# Patient Record
Sex: FEMALE | Race: WHITE | NOT HISPANIC OR LATINO | Employment: UNEMPLOYED | URBAN - NONMETROPOLITAN AREA
[De-identification: names, ages, dates, MRNs, and addresses within clinical notes are randomized per-mention and may not be internally consistent; named-entity substitution may affect disease eponyms.]

---

## 2017-02-28 ENCOUNTER — OFFICE VISIT (OUTPATIENT)
Dept: BARIATRICS/WEIGHT MGMT | Facility: CLINIC | Age: 42
End: 2017-02-28

## 2017-02-28 ENCOUNTER — NUTRITION (OUTPATIENT)
Dept: BARIATRICS/WEIGHT MGMT | Facility: HOSPITAL | Age: 42
End: 2017-02-28

## 2017-02-28 VITALS
DIASTOLIC BLOOD PRESSURE: 86 MMHG | BODY MASS INDEX: 35.51 KG/M2 | HEART RATE: 98 BPM | OXYGEN SATURATION: 100 % | RESPIRATION RATE: 20 BRPM | HEIGHT: 63 IN | SYSTOLIC BLOOD PRESSURE: 159 MMHG | WEIGHT: 200.4 LBS | TEMPERATURE: 98.2 F

## 2017-02-28 DIAGNOSIS — E11.69 DIABETES MELLITUS TYPE 2 IN OBESE (HCC): ICD-10-CM

## 2017-02-28 DIAGNOSIS — Z13.21 SCREENING FOR MALNUTRITION: ICD-10-CM

## 2017-02-28 DIAGNOSIS — E78.5 HYPERLIPIDEMIA, UNSPECIFIED HYPERLIPIDEMIA TYPE: ICD-10-CM

## 2017-02-28 DIAGNOSIS — E66.01 MORBID OBESITY, UNSPECIFIED OBESITY TYPE (HCC): Primary | ICD-10-CM

## 2017-02-28 DIAGNOSIS — I10 HTN (HYPERTENSION), BENIGN: ICD-10-CM

## 2017-02-28 DIAGNOSIS — E66.9 DIABETES MELLITUS TYPE 2 IN OBESE (HCC): ICD-10-CM

## 2017-02-28 DIAGNOSIS — E66.9 OBESITY, CLASS II, BMI 35-39.9: ICD-10-CM

## 2017-02-28 PROBLEM — E66.812 OBESITY, CLASS II, BMI 35-39.9: Status: ACTIVE | Noted: 2017-02-28

## 2017-02-28 PROCEDURE — 99204 OFFICE O/P NEW MOD 45 MIN: CPT | Performed by: NURSE PRACTITIONER

## 2017-02-28 PROCEDURE — 97803 MED NUTRITION INDIV SUBSEQ: CPT

## 2017-02-28 RX ORDER — VALSARTAN AND HYDROCHLOROTHIAZIDE 80; 12.5 MG/1; MG/1
1 TABLET, FILM COATED ORAL DAILY
COMMUNITY
Start: 2014-08-04 | End: 2021-10-10

## 2017-02-28 RX ORDER — ESOMEPRAZOLE MAGNESIUM 40 MG/1
40 CAPSULE, DELAYED RELEASE ORAL
COMMUNITY

## 2017-02-28 RX ORDER — SAXAGLIPTIN 5 MG/1
TABLET, FILM COATED ORAL DAILY
COMMUNITY
Start: 2017-02-08 | End: 2017-03-16 | Stop reason: DRUGHIGH

## 2017-02-28 NOTE — PROGRESS NOTES
Subjective   Ellie Gross is a 41 y.o. female.     History of Present Illness Ellie Gross is a 41 y.o. year-old who has morbid obesity and is interested in having surgical weight loss versus medical weight loss.  Patient has been overweight for the past 17 years. The most weight ever lost was 15 pounds from using low carb diet, fasting, and riding bicycle. Unsupervised diet attempts include: Gely's diet, Slim Fast, low carb diet, and Luis F Meng.  Supervised diet attempts include: Weight Watchers/  Patient has tried the following OTC or prescription medications for weight loss: Metformin (caused nausea and could not take; was actually given for diabetic tx); currently on buproprion for depression and has been for years.  Patient states she eats because the food makes her feel happy or she is bored.  Patient is limited in activities due to: SOB on exertion due to deconditioning and left knee pain sometimes. 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     She  has a past medical history of Acid reflux; Anxiety; Depression; Dysphagia; Elevated cholesterol; Foot pain; Heartburn; Hemorrhoids; Hypertension; IBS (irritable bowel syndrome); Joint pain; Knee pain; Snoring; SOB (shortness of breath) on exertion; Type 2 diabetes mellitus; and Wears glasses.  She  does not have any pertinent problems on file.  She  has a past surgical history that includes Other surgical history; Esophagogastroduodenoscopy (09/23/2014); Esophagogastroduodenoscopy (N/A, 12/30/2016); and Cholecystectomy (2007).  Her family history includes Cancer in her maternal grandmother; Colon polyps in her father; Heart disease in her paternal grandfather; Hypertension in her father, mother, and sister. There is no history of Colon cancer.  She  reports that she quit smoking about 5 years ago. Her smoking use included Cigarettes. She has a 1.50 pack-year smoking history. She has never used smokeless tobacco. She reports that she does not  "drink alcohol or use illicit drugs.  Current Outpatient Prescriptions   Medication Sig Dispense Refill   • Albiglutide (TANZEUM) 50 MG pen-injector Inject  under the skin 1 (One) Time Per Week.     • BuPROPion HCl (WELLBUTRIN PO) Take 300 mg by mouth Daily.     • esomeprazole (nexIUM) 40 MG capsule Take 40 mg by mouth Every Morning Before Breakfast.     • GLYBURIDE PO Take 5 mg by mouth Daily.     • Pioglitazone HCl (ACTOS PO) Take 30 mg by mouth Daily.     • Rosuvastatin Calcium (CRESTOR PO) Take 10 mg by mouth Daily.     • valsartan-hydrochlorothiazide (DIOVAN HCT) 80-12.5 MG per tablet Daily.     • Vortioxetine HBr (TRINTELLIX) 10 MG tablet Take 10 mg by mouth Daily.     • ONGLYZA 5 MG tablet Daily.     • Zolpidem Tartrate (AMBIEN PO) Take  by mouth.       No current facility-administered medications for this visit.      She is allergic to metformin and related.      The following portions of the patient's history were reviewed and updated as appropriate: allergies, current medications, past family history, past medical history, past social history, past surgical history and problem list.    Review of Systems   Constitutional: Positive for fatigue and unexpected weight change. Negative for activity change and appetite change.   HENT: Negative.    Eyes: Negative.         Wears corrective lenses   Respiratory: Positive for shortness of breath. Negative for apnea, cough and chest tightness.         Snoring; only scored a \"4\" on Geff sleepiness scale; therefore a sleep study was not ordered today    Cardiovascular: Negative.  Negative for chest pain, palpitations and leg swelling.        HTN and hyperlipidemia   Gastrointestinal: Positive for diarrhea. Negative for abdominal pain, anal bleeding, constipation, nausea and vomiting.        Heartburn; GERD; dysphagia, EGD with dilation   Endocrine: Positive for polydipsia and polyuria.        Type II diabetes   Genitourinary: Positive for urgency.        Stress " incontinence; has had TVT sling   Musculoskeletal: Positive for arthralgias. Negative for back pain.        Knee pain and ball of foot pain   Skin: Negative.    Allergic/Immunologic: Negative.    Neurological: Negative.  Negative for seizures and syncope.   Hematological: Negative.  Does not bruise/bleed easily.   Psychiatric/Behavioral: Positive for dysphoric mood and sleep disturbance. Negative for self-injury and suicidal ideas. The patient is nervous/anxious.        Objective   Physical Exam   Constitutional: She is oriented to person, place, and time. Vital signs are normal. She appears well-developed and well-nourished. She is cooperative. No distress.   HENT:   Head: Normocephalic and atraumatic.   Nose: Nose normal.   Mouth/Throat: Oropharynx is clear and moist. No oropharyngeal exudate or tonsillar abscesses.   Eyes: Conjunctivae, EOM and lids are normal. Pupils are equal, round, and reactive to light. Right eye exhibits no discharge. Left eye exhibits no discharge.   Neck: Trachea normal. Neck supple. No JVD present. Carotid bruit is not present. No rigidity. No tracheal deviation present. No thyromegaly present.   Cardiovascular: Normal rate, regular rhythm, S1 normal, S2 normal and normal heart sounds.    Pulmonary/Chest: Effort normal and breath sounds normal. No stridor. No respiratory distress. She has no wheezes. She has no rales.   Abdominal: Soft. Bowel sounds are normal. She exhibits no distension. There is no tenderness.   Obese; scattered healed lap scars noted; no hernias   Musculoskeletal: She exhibits no edema.        Right shoulder: She exhibits normal strength.   Lymphadenopathy:     She has no cervical adenopathy.   Neurological: She is alert and oriented to person, place, and time. She has normal strength. No cranial nerve deficit.   Skin: Skin is warm, dry and intact. No rash noted.   Psychiatric: She has a normal mood and affect. Her speech is normal and behavior is normal.   Alert and  oriented x 3   Vitals reviewed.      Assessment/Plan   Ellie was seen today for weight loss and obesity.    Diagnoses and all orders for this visit:    Morbid obesity, unspecified obesity type  -     CBC (No Diff)  -     Comprehensive Metabolic Panel  -     Folate  -     Iron  -     Magnesium  -     Phosphorus  -     TSH  -     Vitamin A  -     Vitamin B1, Whole Blood  -     Vitamin B12  -     Vitamin D 25 Hydroxy  -     Vitamin E  -     Vitamin K1  -     Zinc  -     Lipid Panel  -     Bariatric Nutritional Counseling; Standing    Obesity, Class II, BMI 35-39.9  -     CBC (No Diff)  -     Comprehensive Metabolic Panel  -     Folate  -     Iron  -     Magnesium  -     Phosphorus  -     TSH  -     Vitamin A  -     Vitamin B1, Whole Blood  -     Vitamin B12  -     Vitamin D 25 Hydroxy  -     Vitamin E  -     Vitamin K1  -     Zinc  -     Lipid Panel  -     Bariatric Nutritional Counseling; Standing    Screening for malnutrition  -     CBC (No Diff)  -     Comprehensive Metabolic Panel  -     Folate  -     Iron  -     Magnesium  -     Phosphorus  -     TSH  -     Vitamin A  -     Vitamin B1, Whole Blood  -     Vitamin B12  -     Vitamin D 25 Hydroxy  -     Vitamin E  -     Vitamin K1  -     Zinc  -     Lipid Panel    HTN (hypertension), benign  -     CBC (No Diff)  -     Comprehensive Metabolic Panel  -     Folate  -     Iron  -     Magnesium  -     Phosphorus  -     TSH  -     Vitamin A  -     Vitamin B1, Whole Blood  -     Vitamin B12  -     Vitamin D 25 Hydroxy  -     Vitamin E  -     Vitamin K1  -     Zinc  -     Lipid Panel  -     Bariatric Nutritional Counseling; Standing    Diabetes mellitus type 2 in obese  -     CBC (No Diff)  -     Comprehensive Metabolic Panel  -     Folate  -     Iron  -     Magnesium  -     Phosphorus  -     TSH  -     Vitamin A  -     Vitamin B1, Whole Blood  -     Vitamin B12  -     Vitamin D 25 Hydroxy  -     Vitamin E  -     Vitamin K1  -     Zinc  -     Lipid Panel  -     Bariatric  Nutritional Counseling; Standing    Hyperlipidemia, unspecified hyperlipidemia type  -     CBC (No Diff)  -     Comprehensive Metabolic Panel  -     Folate  -     Iron  -     Magnesium  -     Phosphorus  -     TSH  -     Vitamin A  -     Vitamin B1, Whole Blood  -     Vitamin B12  -     Vitamin D 25 Hydroxy  -     Vitamin E  -     Vitamin K1  -     Zinc  -     Lipid Panel  -     Bariatric Nutritional Counseling; Standing          Ellie Gross is a  41 y.o. who has morbid obesity with BMI of 35.5 and desires surgical weight loss versus medical weight loss. Patient has the following comorbidities: HTN, Hyperlipidemia, diabetes Type II, GERD. She sees her PCP Dr. Rodgers for all of those issues and those are stable.  Patient has been advised that this surgery is considered to be elective major surgery that is typically done laparoscopically. Patient is a potentially good surgical candidate. Patient will need to meet with the Bariatric Surgeon to further discuss surgical options. Patient has been advised that they will need to have a work-up prior to surgery. This work-up will include an EGD to assess for H. Pylori and Cuenca's esophagus. Additionally, patient will need to have a psychological evaluation and clearance prior to surgery. Patient will need the following additional clearances: cardiac. Baseline lab work will be obtained today. Patient will see the dietician today for make specific goals for diet, exercise, and lifestyle. Patient has received intensive behavioral therapy for obesity today. I will have them follow up in one month for a weight recheck and to further discuss options.

## 2017-02-28 NOTE — PATIENT INSTRUCTIONS
Ellie Gross is a  41 y.o. who has morbid obesity with BMI of 35.5 and desires surgical weight loss versus medical weight loss. Patient has the following comorbidities: HTN, Hyperlipidemia, diabetes Type II, GERD. She sees her PCP Dr. Rodgers for all of those issues and those are stable.  Patient has been advised that this surgery is considered to be elective major surgery that is typically done laparoscopically. Patient is a potentially good surgical candidate. Patient will need to meet with the Bariatric Surgeon to further discuss surgical options. Patient has been advised that they will need to have a work-up prior to surgery. This work-up will include an EGD to assess for H. Pylori and Cuenca's esophagus. Additionally, patient will need to have a psychological evaluation and clearance prior to surgery. Patient will need the following additional clearances: cardiac. Baseline lab work will be obtained today. Patient will see the dietician today for make specific goals for diet, exercise, and lifestyle. Patient has received intensive behavioral therapy for obesity today. I will have them follow up in one month for a weight recheck and to further discuss options.

## 2017-03-03 ENCOUNTER — APPOINTMENT (OUTPATIENT)
Dept: LAB | Facility: HOSPITAL | Age: 42
End: 2017-03-03

## 2017-03-03 ENCOUNTER — APPOINTMENT (OUTPATIENT)
Dept: LAB | Facility: HOSPITAL | Age: 42
End: 2017-03-03
Attending: FAMILY MEDICINE

## 2017-03-03 ENCOUNTER — TRANSCRIBE ORDERS (OUTPATIENT)
Dept: ADMINISTRATIVE | Facility: HOSPITAL | Age: 42
End: 2017-03-03

## 2017-03-03 ENCOUNTER — TELEPHONE (OUTPATIENT)
Dept: BARIATRICS/WEIGHT MGMT | Facility: CLINIC | Age: 42
End: 2017-03-03

## 2017-03-03 DIAGNOSIS — R53.81 OTHER MALAISE: ICD-10-CM

## 2017-03-03 DIAGNOSIS — E11.9 DIABETES MELLITUS WITHOUT COMPLICATION (HCC): ICD-10-CM

## 2017-03-03 DIAGNOSIS — E55.9 VITAMIN D DEFICIENCY: Primary | ICD-10-CM

## 2017-03-03 DIAGNOSIS — I10 ESSENTIAL HYPERTENSION, MALIGNANT: Primary | ICD-10-CM

## 2017-03-03 DIAGNOSIS — E78.49 FAMILIAL COMBINED HYPERLIPIDEMIA: ICD-10-CM

## 2017-03-03 LAB
25(OH)D3 SERPL-MCNC: 21.6 NG/ML (ref 30–100)
ALBUMIN SERPL-MCNC: 4.4 G/DL (ref 3.5–5)
ALBUMIN/GLOB SERPL: 1.5 G/DL (ref 1.1–2.5)
ALP SERPL-CCNC: 131 U/L (ref 24–120)
ALT SERPL W P-5'-P-CCNC: 15 U/L (ref 0–54)
ANION GAP SERPL CALCULATED.3IONS-SCNC: 14 MMOL/L (ref 4–13)
ARTICHOKE IGE QN: 153 MG/DL (ref 0–99)
AST SERPL-CCNC: 22 U/L (ref 7–45)
BASOPHILS # BLD AUTO: 0.03 10*3/MM3 (ref 0–0.2)
BASOPHILS NFR BLD AUTO: 0.3 % (ref 0–2)
BILIRUB SERPL-MCNC: 0.6 MG/DL (ref 0.1–1)
BUN BLD-MCNC: 15 MG/DL (ref 5–21)
BUN/CREAT SERPL: 27.8 (ref 7–25)
CALCIUM SPEC-SCNC: 9.6 MG/DL (ref 8.4–10.4)
CHLORIDE SERPL-SCNC: 100 MMOL/L (ref 98–110)
CHOLEST SERPL-MCNC: 245 MG/DL (ref 130–200)
CO2 SERPL-SCNC: 23 MMOL/L (ref 24–31)
CREAT BLD-MCNC: 0.54 MG/DL (ref 0.5–1.4)
DEPRECATED RDW RBC AUTO: 42.2 FL (ref 40–54)
EOSINOPHIL # BLD AUTO: 0.05 10*3/MM3 (ref 0–0.7)
EOSINOPHIL NFR BLD AUTO: 0.5 % (ref 0–4)
ERYTHROCYTE [DISTWIDTH] IN BLOOD BY AUTOMATED COUNT: 15 % (ref 12–15)
FOLATE SERPL-MCNC: 15.4 NG/ML
GFR SERPL CREATININE-BSD FRML MDRD: 123 ML/MIN/1.73
GLOBULIN UR ELPH-MCNC: 3 GM/DL
GLUCOSE BLD-MCNC: 274 MG/DL (ref 70–100)
HBA1C MFR BLD: 11 %
HCT VFR BLD AUTO: 38.3 % (ref 37–47)
HDLC SERPL-MCNC: 45 MG/DL
HGB BLD-MCNC: 12.7 G/DL (ref 12–16)
IMM GRANULOCYTES # BLD: 0.03 10*3/MM3 (ref 0–0.03)
IMM GRANULOCYTES NFR BLD: 0.3 % (ref 0–5)
IRON 24H UR-MRATE: 53 MCG/DL (ref 42–180)
LDLC/HDLC SERPL: 2.93 {RATIO}
LYMPHOCYTES # BLD AUTO: 2.11 10*3/MM3 (ref 0.72–4.86)
LYMPHOCYTES NFR BLD AUTO: 20.7 % (ref 15–45)
MAGNESIUM SERPL-MCNC: 1.7 MG/DL (ref 1.4–2.2)
MCH RBC QN AUTO: 25.8 PG (ref 28–32)
MCHC RBC AUTO-ENTMCNC: 33.2 G/DL (ref 33–36)
MCV RBC AUTO: 77.7 FL (ref 82–98)
MONOCYTES # BLD AUTO: 0.45 10*3/MM3 (ref 0.19–1.3)
MONOCYTES NFR BLD AUTO: 4.4 % (ref 4–12)
NEUTROPHILS # BLD AUTO: 7.52 10*3/MM3 (ref 1.87–8.4)
NEUTROPHILS NFR BLD AUTO: 73.8 % (ref 39–78)
PHOSPHATE SERPL-MCNC: 3 MG/DL (ref 2.5–4.5)
PLATELET # BLD AUTO: 280 10*3/MM3 (ref 130–400)
PMV BLD AUTO: 12.7 FL (ref 6–12)
POTASSIUM BLD-SCNC: 3.9 MMOL/L (ref 3.5–5.3)
PROT SERPL-MCNC: 7.4 G/DL (ref 6.3–8.7)
RBC # BLD AUTO: 4.93 10*6/MM3 (ref 4.2–5.4)
SODIUM BLD-SCNC: 137 MMOL/L (ref 135–145)
T4 FREE SERPL-MCNC: 1.19 NG/DL (ref 0.78–2.19)
TRIGL SERPL-MCNC: 340 MG/DL (ref 0–149)
TSH SERPL DL<=0.05 MIU/L-ACNC: 1.93 MIU/ML (ref 0.47–4.68)
VIT B12 BLD-MCNC: 410 PG/ML (ref 239–931)
WBC NRBC COR # BLD: 10.19 10*3/MM3 (ref 4.8–10.8)

## 2017-03-03 PROCEDURE — 84100 ASSAY OF PHOSPHORUS: CPT | Performed by: NURSE PRACTITIONER

## 2017-03-03 PROCEDURE — 84597 ASSAY OF VITAMIN K: CPT | Performed by: NURSE PRACTITIONER

## 2017-03-03 PROCEDURE — 83735 ASSAY OF MAGNESIUM: CPT | Performed by: NURSE PRACTITIONER

## 2017-03-03 PROCEDURE — 82607 VITAMIN B-12: CPT | Performed by: NURSE PRACTITIONER

## 2017-03-03 PROCEDURE — 80061 LIPID PANEL: CPT | Performed by: NURSE PRACTITIONER

## 2017-03-03 PROCEDURE — 84425 ASSAY OF VITAMIN B-1: CPT | Performed by: NURSE PRACTITIONER

## 2017-03-03 PROCEDURE — 85025 COMPLETE CBC W/AUTO DIFF WBC: CPT | Performed by: FAMILY MEDICINE

## 2017-03-03 PROCEDURE — 82746 ASSAY OF FOLIC ACID SERUM: CPT | Performed by: NURSE PRACTITIONER

## 2017-03-03 PROCEDURE — 83540 ASSAY OF IRON: CPT | Performed by: NURSE PRACTITIONER

## 2017-03-03 PROCEDURE — 84439 ASSAY OF FREE THYROXINE: CPT | Performed by: NURSE PRACTITIONER

## 2017-03-03 PROCEDURE — 84590 ASSAY OF VITAMIN A: CPT | Performed by: NURSE PRACTITIONER

## 2017-03-03 PROCEDURE — 84630 ASSAY OF ZINC: CPT | Performed by: NURSE PRACTITIONER

## 2017-03-03 PROCEDURE — 82306 VITAMIN D 25 HYDROXY: CPT | Performed by: NURSE PRACTITIONER

## 2017-03-03 PROCEDURE — 80053 COMPREHEN METABOLIC PANEL: CPT | Performed by: NURSE PRACTITIONER

## 2017-03-03 PROCEDURE — 84443 ASSAY THYROID STIM HORMONE: CPT | Performed by: NURSE PRACTITIONER

## 2017-03-03 PROCEDURE — 83036 HEMOGLOBIN GLYCOSYLATED A1C: CPT | Performed by: FAMILY MEDICINE

## 2017-03-03 PROCEDURE — 36415 COLL VENOUS BLD VENIPUNCTURE: CPT | Performed by: NURSE PRACTITIONER

## 2017-03-03 PROCEDURE — 84446 ASSAY OF VITAMIN E: CPT | Performed by: NURSE PRACTITIONER

## 2017-03-03 RX ORDER — ERGOCALCIFEROL 1.25 MG/1
50000 CAPSULE ORAL WEEKLY
Qty: 4 CAPSULE | Refills: 2 | Status: SHIPPED | OUTPATIENT
Start: 2017-03-03 | End: 2020-08-03

## 2017-03-03 NOTE — TELEPHONE ENCOUNTER
----- Message from MALI Pinedo sent at 3/3/2017 11:01 AM CST -----  Patient's Vitamin D level is low at 21.6. I have sent script into her pharmacy for Ergocalciferol (Vitamin D) 50,000 units to be taken once per week. There are two refills on this medication. She needs to keep refilling and taking until I say to stop.   Her Lipid panel and CMP need to be faxed to her PCP for their records. She does have Hx of Diabetes and hyperlipidemia that he monitors and treats.   Thanks!      3/3/17@3:42  Spoke to patient. AT

## 2017-03-04 LAB — ZINC SERPL-MCNC: 88 UG/DL (ref 56–134)

## 2017-03-06 LAB — VIT B1 BLD-SCNC: 172.7 NMOL/L (ref 66.5–200)

## 2017-03-07 LAB
A-TOCOPHEROL VIT E SERPL-MCNC: 14.2 MG/L (ref 5.3–16.8)
VIT A SERPL-MCNC: 59 UG/DL (ref 20–65)

## 2017-03-09 LAB — PHYTONADIONE SERPL-MCNC: 1.45 NG/ML (ref 0.13–1.88)

## 2017-03-16 ENCOUNTER — HOSPITAL ENCOUNTER (OUTPATIENT)
Facility: HOSPITAL | Age: 42
Setting detail: OBSERVATION
Discharge: HOME | End: 2017-03-17
Attending: EMERGENCY MEDICINE | Admitting: FAMILY MEDICINE

## 2017-03-16 ENCOUNTER — APPOINTMENT (OUTPATIENT)
Dept: CT IMAGING | Facility: HOSPITAL | Age: 42
End: 2017-03-16

## 2017-03-16 DIAGNOSIS — R07.9 CHEST PAIN, UNSPECIFIED TYPE: Primary | ICD-10-CM

## 2017-03-16 LAB
ALBUMIN SERPL-MCNC: 4.4 G/DL (ref 3.5–5)
ALBUMIN/GLOB SERPL: 1.4 G/DL (ref 1.1–2.5)
ALP SERPL-CCNC: 122 U/L (ref 24–120)
ALT SERPL W P-5'-P-CCNC: 25 U/L (ref 0–54)
AMYLASE SERPL-CCNC: 54 U/L (ref 30–110)
ANION GAP SERPL CALCULATED.3IONS-SCNC: 16 MMOL/L (ref 4–13)
APTT PPP: 25.2 SECONDS (ref 24.1–34.8)
AST SERPL-CCNC: 32 U/L (ref 7–45)
BASOPHILS # BLD AUTO: 0.02 10*3/MM3 (ref 0–0.2)
BASOPHILS NFR BLD AUTO: 0.2 % (ref 0–2)
BILIRUB SERPL-MCNC: 0.9 MG/DL (ref 0.1–1)
BUN BLD-MCNC: 16 MG/DL (ref 5–21)
BUN/CREAT SERPL: 25 (ref 7–25)
CALCIUM SPEC-SCNC: 10 MG/DL (ref 8.4–10.4)
CHLORIDE SERPL-SCNC: 97 MMOL/L (ref 98–110)
CO2 SERPL-SCNC: 22 MMOL/L (ref 24–31)
CREAT BLD-MCNC: 0.64 MG/DL (ref 0.5–1.4)
D DIMER PPP FEU-MCNC: <0.22 MG/L (FEU) (ref 0–0.5)
DEPRECATED RDW RBC AUTO: 41.9 FL (ref 40–54)
EOSINOPHIL # BLD AUTO: 0.04 10*3/MM3 (ref 0–0.7)
EOSINOPHIL NFR BLD AUTO: 0.5 % (ref 0–4)
ERYTHROCYTE [DISTWIDTH] IN BLOOD BY AUTOMATED COUNT: 14.8 % (ref 12–15)
GFR SERPL CREATININE-BSD FRML MDRD: 101 ML/MIN/1.73
GLOBULIN UR ELPH-MCNC: 3.2 GM/DL
GLUCOSE BLD-MCNC: 181 MG/DL (ref 70–100)
HCT VFR BLD AUTO: 39.5 % (ref 37–47)
HGB BLD-MCNC: 13.3 G/DL (ref 12–16)
HOLD SPECIMEN: NORMAL
HOLD SPECIMEN: NORMAL
IMM GRANULOCYTES # BLD: 0.01 10*3/MM3 (ref 0–0.03)
IMM GRANULOCYTES NFR BLD: 0.1 % (ref 0–5)
INR PPP: 0.91 (ref 0.91–1.09)
LIPASE SERPL-CCNC: 70 U/L (ref 23–203)
LYMPHOCYTES # BLD AUTO: 2.44 10*3/MM3 (ref 0.72–4.86)
LYMPHOCYTES NFR BLD AUTO: 29.7 % (ref 15–45)
MCH RBC QN AUTO: 25.8 PG (ref 28–32)
MCHC RBC AUTO-ENTMCNC: 33.7 G/DL (ref 33–36)
MCV RBC AUTO: 76.7 FL (ref 82–98)
MONOCYTES # BLD AUTO: 0.58 10*3/MM3 (ref 0.19–1.3)
MONOCYTES NFR BLD AUTO: 7.1 % (ref 4–12)
NEUTROPHILS # BLD AUTO: 5.13 10*3/MM3 (ref 1.87–8.4)
NEUTROPHILS NFR BLD AUTO: 62.4 % (ref 39–78)
NT-PROBNP SERPL-MCNC: 31.6 PG/ML (ref 0–450)
PLATELET # BLD AUTO: 337 10*3/MM3 (ref 130–400)
PMV BLD AUTO: 12.1 FL (ref 6–12)
POTASSIUM BLD-SCNC: 3.4 MMOL/L (ref 3.5–5.3)
PROT SERPL-MCNC: 7.6 G/DL (ref 6.3–8.7)
PROTHROMBIN TIME: 12.5 SECONDS (ref 11.9–14.6)
RBC # BLD AUTO: 5.15 10*6/MM3 (ref 4.2–5.4)
SODIUM BLD-SCNC: 135 MMOL/L (ref 135–145)
TROPONIN I SERPL-MCNC: 0 NG/ML (ref 0–0.07)
TROPONIN I SERPL-MCNC: <0.012 NG/ML (ref 0–0.03)
TROPONIN I SERPL-MCNC: <0.012 NG/ML (ref 0–0.03)
WBC NRBC COR # BLD: 8.22 10*3/MM3 (ref 4.8–10.8)
WHOLE BLOOD HOLD SPECIMEN: NORMAL
WHOLE BLOOD HOLD SPECIMEN: NORMAL

## 2017-03-16 PROCEDURE — 83880 ASSAY OF NATRIURETIC PEPTIDE: CPT | Performed by: EMERGENCY MEDICINE

## 2017-03-16 PROCEDURE — 36415 COLL VENOUS BLD VENIPUNCTURE: CPT

## 2017-03-16 PROCEDURE — 96361 HYDRATE IV INFUSION ADD-ON: CPT

## 2017-03-16 PROCEDURE — 82150 ASSAY OF AMYLASE: CPT | Performed by: EMERGENCY MEDICINE

## 2017-03-16 PROCEDURE — 85730 THROMBOPLASTIN TIME PARTIAL: CPT | Performed by: EMERGENCY MEDICINE

## 2017-03-16 PROCEDURE — 96360 HYDRATION IV INFUSION INIT: CPT

## 2017-03-16 PROCEDURE — G0378 HOSPITAL OBSERVATION PER HR: HCPCS

## 2017-03-16 PROCEDURE — 85379 FIBRIN DEGRADATION QUANT: CPT | Performed by: EMERGENCY MEDICINE

## 2017-03-16 PROCEDURE — 99285 EMERGENCY DEPT VISIT HI MDM: CPT

## 2017-03-16 PROCEDURE — 84484 ASSAY OF TROPONIN QUANT: CPT | Performed by: FAMILY MEDICINE

## 2017-03-16 PROCEDURE — 85610 PROTHROMBIN TIME: CPT | Performed by: EMERGENCY MEDICINE

## 2017-03-16 PROCEDURE — 85025 COMPLETE CBC W/AUTO DIFF WBC: CPT | Performed by: EMERGENCY MEDICINE

## 2017-03-16 PROCEDURE — 71275 CT ANGIOGRAPHY CHEST: CPT

## 2017-03-16 PROCEDURE — 80053 COMPREHEN METABOLIC PANEL: CPT | Performed by: EMERGENCY MEDICINE

## 2017-03-16 PROCEDURE — 0 IOPAMIDOL PER 1 ML: Performed by: EMERGENCY MEDICINE

## 2017-03-16 PROCEDURE — 84484 ASSAY OF TROPONIN QUANT: CPT

## 2017-03-16 PROCEDURE — 83690 ASSAY OF LIPASE: CPT | Performed by: EMERGENCY MEDICINE

## 2017-03-16 RX ORDER — NITROGLYCERIN 0.4 MG/1
0.4 TABLET SUBLINGUAL
Status: DISCONTINUED | OUTPATIENT
Start: 2017-03-16 | End: 2017-03-17 | Stop reason: HOSPADM

## 2017-03-16 RX ORDER — ACETAMINOPHEN 325 MG/1
650 TABLET ORAL EVERY 6 HOURS PRN
Status: DISCONTINUED | OUTPATIENT
Start: 2017-03-16 | End: 2017-03-17 | Stop reason: HOSPADM

## 2017-03-16 RX ORDER — PIOGLITAZONEHYDROCHLORIDE 30 MG/1
30 TABLET ORAL DAILY
COMMUNITY

## 2017-03-16 RX ORDER — GLYBURIDE 5 MG/1
5 TABLET ORAL
COMMUNITY
End: 2021-09-22

## 2017-03-16 RX ORDER — SODIUM CHLORIDE 9 MG/ML
125 INJECTION, SOLUTION INTRAVENOUS CONTINUOUS
Status: DISCONTINUED | OUTPATIENT
Start: 2017-03-16 | End: 2017-03-17

## 2017-03-16 RX ORDER — ROSUVASTATIN CALCIUM 10 MG/1
10 TABLET, COATED ORAL DAILY
COMMUNITY

## 2017-03-16 RX ADMIN — NITROGLYCERIN 0.4 MG: 0.4 TABLET SUBLINGUAL at 12:49

## 2017-03-16 RX ADMIN — IOPAMIDOL 90 ML: 755 INJECTION, SOLUTION INTRAVENOUS at 12:06

## 2017-03-16 RX ADMIN — ACETAMINOPHEN 650 MG: 325 TABLET ORAL at 23:08

## 2017-03-16 RX ADMIN — NITROGLYCERIN 0.4 MG: 0.4 TABLET SUBLINGUAL at 12:56

## 2017-03-16 RX ADMIN — SODIUM CHLORIDE 125 ML/HR: 9 INJECTION, SOLUTION INTRAVENOUS at 12:54

## 2017-03-16 NOTE — ED PROVIDER NOTES
Subjective chest pain  History of Present Illness  The pt presents today with complaint of chest pain.  She tells me that she has recently started Tanzeum.  Since that time she has noticed nausea and vomiting as well as intermittent chest pain.   The nausea and vomiting she attributed to the new medication.  She then developed chest pain that has become persistent for the last two days.   Due to its persistence, she went to see DR. Rodgers.  ATthe office she apparently received a NTG with relief of her discomfort.  She was thereby sent to the ER for re-evaluation.   Upon my arrival at the bedside the patient was not experiencing any chest pain.  It was non-radiating and unaccompanied by any other symptoms.        Review of Systems   Constitutional: Negative.    HENT: Negative.    Eyes: Negative.    Respiratory: Negative.    Cardiovascular: Positive for chest pain.   Gastrointestinal: Positive for nausea and vomiting.   Endocrine: Negative.    Genitourinary: Negative.    Musculoskeletal: Negative.    Skin: Negative.    Allergic/Immunologic: Negative.    Neurological: Negative.    Hematological: Negative.    Psychiatric/Behavioral: Negative.    All other systems reviewed and are negative.      Past Medical History   Diagnosis Date   • Acid reflux      on Nexium; still moderate despite PPI   • Anxiety    • Depression    • Dysphagia    • Elevated cholesterol      on medication   • Foot pain      ball of foot    • Heartburn    • Hemorrhoids    • Hypertension      on meds; runs normal at home   • IBS (irritable bowel syndrome)    • Joint pain    • Knee pain    • Snoring    • SOB (shortness of breath) on exertion      deconditioned   • Type 2 diabetes mellitus      A1C was 6.8%   • Wears glasses        Allergies   Allergen Reactions   • Metformin And Related Nausea Only       Past Surgical History   Procedure Laterality Date   • Other surgical history       TVT   • Endoscopy  09/23/2014     Distal esophageal ring dilated 50  [place]   • Endoscopy N/A 12/30/2016     Procedure: ESOPHAGOGASTRODUODENOSCOPY WITH ANESTHESIA;  Surgeon: Maged Rios MD;  Location: [facility] ENDOSCOPY;  Service:    • Cholecystectomy  2007     Laparoscopic         Family History   Problem Relation Age of Onset   • Colon polyps Father    • Hypertension Father    • Hypertension Mother    • Hypertension Sister    • Cancer Maternal Grandmother    • Heart disease Paternal Grandfather    • Colon cancer Neg Hx        Social History     Social History   • Marital status:      Spouse name: N/A   • Number of children: N/A   • Years of education: N/A     Social History Main Topics   • Smoking status: Former Smoker     Packs/day: 0.50     Years: 3.00     Types: Cigarettes     Quit date: 2012   • Smokeless tobacco: Never Used   • Alcohol use No   • Drug use: No   • Sexual activity: Defer     Other Topics Concern   • None     Social History Narrative           Objective   Physical Exam   Constitutional: She is oriented to person, place, and time. She appears well-developed and well-nourished.   HENT:   Head: Normocephalic and atraumatic.   Right Ear: External ear normal.   Left Ear: External ear normal.   Nose: Nose normal.   Mouth/Throat: Oropharynx is clear and moist.   Eyes: Conjunctivae and EOM are normal. Pupils are equal, round, and reactive to light. Right eye exhibits no discharge. Left eye exhibits no discharge.   Neck: Normal range of motion. Neck supple. No thyromegaly present.   Cardiovascular: Normal rate, regular rhythm, normal heart sounds and intact distal pulses.  Exam reveals no friction rub.    No murmur heard.  Pulmonary/Chest: Effort normal and breath sounds normal. No respiratory distress.   Abdominal: Soft. Bowel sounds are normal. She exhibits no distension. There is no tenderness.   Musculoskeletal: Normal range of motion. She exhibits no edema or deformity.   Neurological: She is alert and oriented to person, place, and time. She has normal  reflexes. No cranial nerve deficit.   Skin: Skin is warm and dry. No rash noted.   Psychiatric: Judgment normal.   Nursing note and vitals reviewed.      Procedures      Differential diagnosis included but was not limited to Chest pain: Myocardial Ischemia, Pneumonia, Pleuritis, Bronchitis, GERD, PE, pericarditis, myocarditis and pleurodynea>>. All labs and imaging results were reviewed by Helen Rodriguez MD    ED Course  ED Course   Comment By Time   Upon recheck the patient was having pain.  NTG will be given. Helen Rodriguez MD 03/16 1303   The pt was resting more comfortably upon my second recheck..  I have spoken with Dr. Rodgers and she will be admitted to his services for further troponins and stress.   Helen Rodriguez MD 03/16 1349                  MDM  Number of Diagnoses or Management Options  Chest pain, unspecified type: new and requires workup     Amount and/or Complexity of Data Reviewed  Clinical lab tests: ordered and reviewed  Tests in the radiology section of CPT®: ordered and reviewed  Discuss the patient with other providers: yes    Risk of Complications, Morbidity, and/or Mortality  Presenting problems: high  Diagnostic procedures: high  Management options: high    Patient Progress  Patient progress: stable      Final diagnoses:   Chest pain, unspecified type            Helen Rodriguez MD  03/16/17 3594

## 2017-03-16 NOTE — PLAN OF CARE
Problem: Patient Care Overview (Adult)  Goal: Plan of Care Review  Outcome: Ongoing (interventions implemented as appropriate)  Goal: Adult Individualization and Mutuality  Outcome: Ongoing (interventions implemented as appropriate)  Goal: Discharge Needs Assessment  Outcome: Ongoing (interventions implemented as appropriate)    Problem: Acute Coronary Syndrome (ACS) (Adult)  Goal: Signs and Symptoms of Listed Potential Problems Will be Absent or Manageable (Acute Coronary Syndrome)  Presently denies any active chest pain    03/16/17 1525   Acute Coronary Syndrome (ACS)   Problems Assessed (Acute Coronary Syndrome (ACS)) all   Problems Present (Acute Coronary Syndrome (ACS)) none

## 2017-03-17 ENCOUNTER — APPOINTMENT (OUTPATIENT)
Dept: CARDIOLOGY | Facility: HOSPITAL | Age: 42
End: 2017-03-17
Attending: FAMILY MEDICINE

## 2017-03-17 VITALS
TEMPERATURE: 98.1 F | DIASTOLIC BLOOD PRESSURE: 76 MMHG | SYSTOLIC BLOOD PRESSURE: 135 MMHG | HEART RATE: 94 BPM | OXYGEN SATURATION: 91 % | HEIGHT: 63 IN | BODY MASS INDEX: 34.06 KG/M2 | WEIGHT: 192.25 LBS | RESPIRATION RATE: 18 BRPM

## 2017-03-17 LAB
BH CV STRESS BP STAGE 1: NORMAL
BH CV STRESS BP STAGE 2: NORMAL
BH CV STRESS DURATION MIN STAGE 1: 3
BH CV STRESS DURATION MIN STAGE 2: 3
BH CV STRESS DURATION SEC STAGE 1: 0
BH CV STRESS DURATION SEC STAGE 2: 0
BH CV STRESS ECHO POST STRESS EJECTION FRACTION EF: 70 %
BH CV STRESS GRADE STAGE 1: 10
BH CV STRESS GRADE STAGE 2: 12
BH CV STRESS HR STAGE 1: 155
BH CV STRESS HR STAGE 2: 162
BH CV STRESS METS STAGE 1: 5
BH CV STRESS METS STAGE 2: 7.5
BH CV STRESS PROTOCOL 1: NORMAL
BH CV STRESS RECOVERY BP: NORMAL MMHG
BH CV STRESS RECOVERY HR: 115 BPM
BH CV STRESS SPEED STAGE 1: 1.7
BH CV STRESS SPEED STAGE 2: 2.5
BH CV STRESS STAGE 1: 1
BH CV STRESS STAGE 2: 2
GLUCOSE BLDC GLUCOMTR-MCNC: 158 MG/DL (ref 70–130)
GLUCOSE BLDC GLUCOMTR-MCNC: 159 MG/DL (ref 70–130)
MAXIMAL PREDICTED HEART RATE: 177 BPM
PERCENT MAX PREDICTED HR: 91.53 %
STRESS BASELINE BP: NORMAL MMHG
STRESS BASELINE HR: 97 BPM
STRESS PERCENT HR: 108 %
STRESS POST ESTIMATED WORKLOAD: 7.5 METS
STRESS POST EXERCISE DUR MIN: 6 MIN
STRESS POST PEAK BP: NORMAL MMHG
STRESS POST PEAK HR: 162 BPM
STRESS TARGET HR: 150 BPM

## 2017-03-17 PROCEDURE — G0378 HOSPITAL OBSERVATION PER HR: HCPCS

## 2017-03-17 PROCEDURE — 25010000002 PERFLUTREN (DEFINITY) 8.476 MG IN SODIUM CHLORIDE 10 ML INJECTION: Performed by: INTERNAL MEDICINE

## 2017-03-17 PROCEDURE — 93018 CV STRESS TEST I&R ONLY: CPT | Performed by: INTERNAL MEDICINE

## 2017-03-17 PROCEDURE — 99203 OFFICE O/P NEW LOW 30 MIN: CPT | Performed by: INTERNAL MEDICINE

## 2017-03-17 PROCEDURE — 93005 ELECTROCARDIOGRAM TRACING: CPT | Performed by: FAMILY MEDICINE

## 2017-03-17 PROCEDURE — 93352 ADMIN ECG CONTRAST AGENT: CPT | Performed by: INTERNAL MEDICINE

## 2017-03-17 PROCEDURE — 93010 ELECTROCARDIOGRAM REPORT: CPT | Performed by: INTERNAL MEDICINE

## 2017-03-17 PROCEDURE — C8928 TTE W OR W/O FOL W/CON,STRES: HCPCS

## 2017-03-17 PROCEDURE — 93017 CV STRESS TEST TRACING ONLY: CPT

## 2017-03-17 PROCEDURE — 82962 GLUCOSE BLOOD TEST: CPT

## 2017-03-17 PROCEDURE — 93350 STRESS TTE ONLY: CPT | Performed by: INTERNAL MEDICINE

## 2017-03-17 RX ORDER — DICYCLOMINE HCL 20 MG
20 TABLET ORAL EVERY 6 HOURS
Qty: 90 TABLET | Refills: 3 | Status: SHIPPED | OUTPATIENT
Start: 2017-03-17 | End: 2021-10-10

## 2017-03-17 RX ORDER — SODIUM CHLORIDE 0.9 % (FLUSH) 0.9 %
1-10 SYRINGE (ML) INJECTION AS NEEDED
Status: DISCONTINUED | OUTPATIENT
Start: 2017-03-17 | End: 2017-03-17 | Stop reason: HOSPADM

## 2017-03-17 RX ADMIN — SODIUM CHLORIDE 5 ML: 9 INJECTION INTRAMUSCULAR; INTRAVENOUS; SUBCUTANEOUS at 12:21

## 2017-03-17 RX ADMIN — SODIUM CHLORIDE 5 ML: 9 INJECTION INTRAMUSCULAR; INTRAVENOUS; SUBCUTANEOUS at 12:10

## 2017-03-17 NOTE — PLAN OF CARE
Problem: Patient Care Overview (Adult)  Goal: Plan of Care Review  Outcome: Ongoing (interventions implemented as appropriate)    03/17/17 0213   Coping/Psychosocial Response Interventions   Plan Of Care Reviewed With patient   Patient Care Overview   Progress progress toward functional goals as expected   Outcome Evaluation   Outcome Summary/Follow up Plan Cardiac enzymes negative x3, no c/o chest pain, VSS, will continue to monitor       Goal: Adult Individualization and Mutuality  Outcome: Ongoing (interventions implemented as appropriate)    Problem: Acute Coronary Syndrome (ACS) (Adult)  Goal: Signs and Symptoms of Listed Potential Problems Will be Absent or Manageable (Acute Coronary Syndrome)  Outcome: Ongoing (interventions implemented as appropriate)

## 2017-03-17 NOTE — H&P
[facility]  History and Physical    Patient:  Ellie Gross  MRN: [id number]    CHIEF COMPLAINT:  Chest pain    History Obtained From: the patient   PCP: Dionte Rodgers MD    HISTORY OF PRESENT ILLNESS:   The patient is a 41 y.o. female who presents with multiple cardiac risk factors, including dm II, Htn, dyslipidemia and obesity with complaints of midsternal chest pain with relief with ntg.  Transported from office to ER, where repeat NTG given .  Cardiac ez's are negative.  Will ask cards to eval.    REVIEW OF SYSTEMS:    Constitutional: Negative for activity change, appetite change, chills, fatigue and fever.   HENT: Negative.  Negative for congestion, sinus pressure and sore throat.    Eyes: Negative for pain and discharge.   Respiratory: Negative.  Negative for cough, chest tightness, shortness of breath and wheezing.    Cardiovascular: POSITIVE for chest pain and no leg swelling.   Gastrointestinal: Negative for abdominal distention, abdominal pain, diarrhea, nausea and vomiting.   Genitourinary: Negative for difficulty urinating, flank pain, hematuria and urgency.   Musculoskeletal: Negative for arthralgias and back pain.   Skin: Negative for color change, pallor and rash.   Neurological: Negative for dizziness, syncope, numbness and headaches.   Psychiatric/Behavioral: Negative for agitation, behavioral problems and confusion.       Past Medical History:  Past Medical History   Diagnosis Date   • Acid reflux      on Nexium; still moderate despite PPI   • Anxiety    • Depression    • Dysphagia    • Elevated cholesterol      on medication   • Foot pain      ball of foot    • Heartburn    • Hemorrhoids    • Hypertension      on meds; runs normal at home   • IBS (irritable bowel syndrome)    • Joint pain    • Knee pain    • Snoring    • SOB (shortness of breath) on exertion      deconditioned   • Type 2 diabetes mellitus      A1C was 6.8%   • Wears glasses        Past Surgical  History:  Past Surgical History   Procedure Laterality Date   • Other surgical history       TVT   • Endoscopy  09/23/2014     Distal esophageal ring dilated 50 Lithuanian   • Endoscopy N/A 12/30/2016     Procedure: ESOPHAGOGASTRODUODENOSCOPY WITH ANESTHESIA;  Surgeon: Maged Rios MD;  Location: [facility] ENDOSCOPY;  Service:    • Cholecystectomy  2007     Laparoscopic         Medications Prior to Admission:    Prescriptions Prior to Admission   Medication Sig Dispense Refill Last Dose   • BuPROPion HCl (WELLBUTRIN PO) Take 300 mg by mouth Daily.   3/16/2017 at 0845   • ergocalciferol (ERGOCALCIFEROL) 08909 UNITS capsule Take 1 capsule by mouth 1 (One) Time Per Week. (Patient taking differently: Take 50,000 Units by mouth 1 (One) Time Per Week. Tuesday) 4 capsule 2 3/15/2017 at 1800   • esomeprazole (nexIUM) 40 MG capsule Take 40 mg by mouth Every Morning Before Breakfast.   3/16/2017 at 0845   • glyBURIDE (DIAbeta) 5 MG tablet Take 5 mg by mouth Daily With Breakfast.   3/16/2017 at 0845   • Lorcaserin HCl (BELVIQ) 10 MG tablet Take 10 mg by mouth 2 (Two) Times a Day. Patient hasn't taken since Monday, only been taking the medication for 8 days   3/13/2017   • pioglitazone (ACTOS) 30 MG tablet Take 30 mg by mouth Daily.   3/16/2017 at 0845   • rosuvastatin (CRESTOR) 10 MG tablet Take 10 mg by mouth Daily.   3/15/2017 at 2030   • SAXagliptin (ONGLYZA) 5 MG tablet Take 5 mg by mouth Daily.   3/16/2017 at 0845   • valsartan-hydrochlorothiazide (DIOVAN HCT) 80-12.5 MG per tablet Take 1 tablet by mouth Daily.   3/16/2017 at 0845   • Vortioxetine HBr (TRINTELLIX) 10 MG tablet Take 10 mg by mouth Daily.   3/15/2017 at 2030   • Albiglutide (TANZEUM) 50 MG pen-injector Inject 50 mg under the skin 1 (One) Time Per Week. Tuesday   3/14/2017 at 1800       Allergies:  Metformin and related    Social History:   Social History     Social History   • Marital status:      Spouse name: N/A   • Number of children: N/A   • Years of  "education: N/A     Occupational History   • Not on file.     Social History Main Topics   • Smoking status: Former Smoker     Packs/day: 0.50     Years: 3.00     Types: Cigarettes     Quit date: 2012   • Smokeless tobacco: Never Used   • Alcohol use No   • Drug use: No   • Sexual activity: Defer     Other Topics Concern   • Not on file     Social History Narrative       Family History:   Family History   Problem Relation Age of Onset   • Colon polyps Father    • Hypertension Father    • Hypertension Mother    • Hypertension Sister    • Cancer Maternal Grandmother    • Heart disease Paternal Grandfather    • Colon cancer Neg Hx            Physical Exam:    Vitals:   Visit Vitals   • /85 (BP Location: Right arm, Patient Position: Lying)   • Pulse 92   • Temp 98 °F (36.7 °C) (Temporal Artery )   • Resp 20   • Ht 63\" (160 cm)   • Wt 192 lb 4 oz (87.2 kg)   • SpO2 98%   • BMI 34.06 kg/m2          General Appearance:    Alert, cooperative, in no acute distress   Head:    Normocephalic, without obvious abnormality, atraumatic   Eyes:            Lids and lashes normal, conjunctivae and sclerae normal, no   icterus, no pallor, corneas clear, PERRLA   Ears:    Ears appear intact with no abnormalities noted   Throat:   No oral lesions, no thrush, oral mucosa moist   Neck:   No adenopathy, supple, trachea midline, no thyromegaly, no   carotid bruit, no JVD   Back:     No kyphosis present, no scoliosis present, no skin lesions,      erythema or scars, no tenderness to percussion or                   palpation,   range of motion normal   Lungs:     Clear to auscultation,respirations regular, even and                  unlabored    Heart:    Regular rhythm and normal rate, normal S1 and S2, no            murmur, no gallop, no rub, no click   Chest Wall:    No abnormalities observed   Abdomen:     Normal bowel sounds, no masses, no organomegaly, soft        non-tender, non-distended, no guarding, no rebound                " tenderness   Rectal:     Deferred   Extremities:   Moves all extremities well, no edema, no cyanosis, no             redness   Pulses:   Pulses palpable and equal bilaterally   Skin:   No bleeding, bruising or rash   Lymph nodes:   No palpable adenopathy   Neurologic:   Cranial nerves 2 - 12 grossly intact, sensation intact, DTR       present and equal bilaterally       Lab Results (last 24 hours)     Procedure Component Value Units Date/Time    CBC & Differential [[id number]] Collected:  03/16/17 1127    Specimen:  Blood Updated:  03/16/17 1140    Narrative:       The following orders were created for panel order CBC & Differential.  Procedure                               Abnormality         Status                     ---------                               -----------         ------                     CBC Auto Differential[[id number]]         Abnormal            Final result                 Please view results for these tests on the individual orders.    CBC Auto Differential [[id number]]  (Abnormal) Collected:  03/16/17 1127    Specimen:  Blood Updated:  03/16/17 1140     WBC 8.22 10*3/mm3      RBC 5.15 10*6/mm3      Hemoglobin 13.3 g/dL      Hematocrit 39.5 %      MCV 76.7 (L) fL      MCH 25.8 (L) pg      MCHC 33.7 g/dL      RDW 14.8 %      RDW-SD 41.9 fl      MPV 12.1 (H) fL      Platelets 337 10*3/mm3      Neutrophil % 62.4 %      Lymphocyte % 29.7 %      Monocyte % 7.1 %      Eosinophil % 0.5 %      Basophil % 0.2 %      Immature Grans % 0.1 %      Neutrophils, Absolute 5.13 10*3/mm3      Lymphocytes, Absolute 2.44 10*3/mm3      Monocytes, Absolute 0.58 10*3/mm3      Eosinophils, Absolute 0.04 10*3/mm3      Basophils, Absolute 0.02 10*3/mm3      Immature Grans, Absolute 0.01 10*3/mm3     POC Troponin, Rapid [[id number]]  (Normal) Collected:  03/16/17 1131    Specimen:  Blood Updated:  03/16/17 1146     Troponin I 0.00 ng/mL       Serial Number: [id number]    : [id number]       Amylase [[id number]]  (Normal)  Collected:  03/16/17 1127    Specimen:  Blood Updated:  03/16/17 1152     Amylase 54 U/L     Comprehensive Metabolic Panel [[id number]]  (Abnormal) Collected:  03/16/17 1127    Specimen:  Blood Updated:  03/16/17 1152     Glucose 181 (H) mg/dL      BUN 16 mg/dL      Creatinine 0.64 mg/dL      Sodium 135 mmol/L      Potassium 3.4 (L) mmol/L      Chloride 97 (L) mmol/L      CO2 22.0 (L) mmol/L      Calcium 10.0 mg/dL      Total Protein 7.6 g/dL      Albumin 4.40 g/dL      ALT (SGPT) 25 U/L      AST (SGOT) 32 U/L      Alkaline Phosphatase 122 (H) U/L      Total Bilirubin 0.9 mg/dL      eGFR Non African Amer 101 mL/min/1.73      Globulin 3.2 gm/dL      A/G Ratio 1.4 g/dL      BUN/Creatinine Ratio 25.0      Anion Gap 16.0 (H) mmol/L     Lipase [[id number]]  (Normal) Collected:  03/16/17 1127    Specimen:  Blood Updated:  03/16/17 1152     Lipase 70 U/L     aPTT [[id number]]  (Normal) Collected:  03/16/17 1127    Specimen:  Blood Updated:  03/16/17 1154     PTT 25.2 seconds     D-dimer, Quantitative [[id number]]  (Normal) Collected:  03/16/17 1127    Specimen:  Blood Updated:  03/16/17 1154     D-Dimer, Quantitative <0.22 mg/L (FEU)     Narrative:       Reference Range is 0-0.50 mg/L FEU. However, results <0.50 mg/L FEU tends to rule out DVT or PE. Results >0.50 mg/L FEU are not useful in predicting absence or presence of DVT or PE.    Protime-INR [[id number]]  (Normal) Collected:  03/16/17 1127    Specimen:  Blood Updated:  03/16/17 1154     Protime 12.5 Seconds      INR 0.91     BNP [[id number]]  (Normal) Collected:  03/16/17 1127    Specimen:  Blood Updated:  03/16/17 1200     proBNP 31.6 pg/mL     Light Blue Top [[id number]] Collected:  03/16/17 1127    Specimen:  Blood Updated:  03/16/17 1601     Extra Tube hold for add-on       Auto resulted       Green Top (Gel) [[id number]] Collected:  03/16/17 1127    Specimen:  Blood Updated:  03/16/17 1601     Extra Tube Hold for add-ons.       Auto resulted.       Lavender Top [[id number]]  Collected:  03/16/17 1127    Specimen:  Blood Updated:  03/16/17 1601     Extra Tube hold for add-on       Auto resulted       Red Top [[id number]] Collected:  03/16/17 1127    Specimen:  Blood Updated:  03/16/17 1601     Extra Tube Hold for add-ons.       Auto resulted.       [place] Draw [[id number]] Collected:  03/16/17 1127    Specimen:  Blood Updated:  03/16/17 1949    Narrative:       The following orders were created for panel order [place] Draw.  Procedure                               Abnormality         Status                     ---------                               -----------         ------                     Light Blue Top[[id number]]                                    Final result               Green Top (Gel)[[id number]]                                   Final result               Lavender Top[[id number]]                                      Final result               Red Top[[id number]]                                           Final result               Green Top (No Gel)[[id number]]                                                             Please view results for these tests on the individual orders.    Troponin [[id number]]  (Normal) Collected:  03/16/17 1927    Specimen:  Blood Updated:  03/16/17 2004     Troponin I <0.012 ng/mL     Troponin [[id number]]  (Normal) Collected:  03/16/17 2152    Specimen:  Blood Updated:  03/16/17 2236     Troponin I <0.012 ng/mL     POC Glucose Fingerstick [[id number]]  (Abnormal) Collected:  03/17/17 0826    Specimen:  Blood Updated:  03/17/17 0847     Glucose 159 (H) mg/dL       : [id number] Alex Aleman ID: [id number]              -----------------------------------------------------------------  EKG: NON-ACUTE  Radiology:     Ct Angiogram Chest With Contrast    Result Date: 3/16/2017  EXAMINATION: CT ANGIOGRAM CHEST W CONTRAST-   3/16/2017 12:03 PM CDT  HISTORY: Chest pain and short of breath.  In order to have a CT radiation dose as low as reasonably achievable  Automated Exposure Control was utilized for adjustment of the mA and/or KV according to patient size.  DLP in mGycm= 478.  CT angiography protocol. CT imaging with bolus IV contrast injection. Under concurrent supervision axial, sagittal, coronal, and three-dimensional data sets were constructed.  Normal heart size. No thoracic aortic aneurysm or dissection.  Symmetric pulmonary arteries with no pulmonary embolism. No mediastinal mass.  Fully expanded lungs with no pneumonia, pneumothorax, or pleural effusion.  No abnormality is seen within the upper abdomen.  Summary: 1. No pulmonary embolism. 2. No acute lung disease.          This report was finalized on [id number] by Dr. Maverick Parsons MD.      Assessment and Plan   1.     Active Problems:    Chest pain       PLAN:  ADMIT OBS  RULE OUT MI  STRESS ECHO/CARDS CONSULT    Dionte Rodgers MD

## 2017-03-17 NOTE — CONSULTS
Inpatient Consult to Cardiology  Consult performed by: MARY WILLETT  Consult ordered by: ANDREAS HAYDEN  Reason for consult: Chest pain        Chief Complaint   Patient presents with   • Chest Pain     History of the Present Illness: This a 41-year-old white female with a history of type II diabetes mellitus, hypertension, hyperlipidemia, former tobacco use who presents for further evaluation of chest discomfort.  The patient states that Tuesday evening, she began have left-sided chest discomfort, described as a pressure, constant, nonradiating, no specific associated signs or symptoms.  This discomfort lasted continuously until Thursday, at which point she presented to her primary care physician's office.  At her primary care physician's office, the patient received nitroglycerin which promptly relieved the chest discomfort, therefore she was sent to the emergency department for further evaluation.  She was then admitted overnight, a stress echocardiogram is ordered and cardiology consult was placed.  The patient notes that she has been chest pain-free and her arrival.  She tells me that last week she had a change in her medications and she initially thought that her symptoms were related to this change in medicines.  Initially after the first doses of her medication, she had an episode of nausea and vomiting but did not have any recurrence of that after the initial episode.  Recently, she denies shortness of breath, dyspnea on exertion, orthopnea, paroxysmal nocturnal dyspnea, edema, lightheadedness, dizziness, syncope.  The patient has never had a personal history of cardiovascular disease and has no early onset family history of coronary artery disease.    Past Medical History   Diagnosis Date   • Acid reflux      on Nexium; still moderate despite PPI   • Anxiety    • Depression    • Dysphagia    • Elevated cholesterol      on medication   • Foot pain      ball of foot    • Heartburn    •  Hemorrhoids    • Hypertension      on meds; runs normal at home   • IBS (irritable bowel syndrome)    • Joint pain    • Knee pain    • Snoring    • SOB (shortness of breath) on exertion      deconditioned   • Type 2 diabetes mellitus      A1C was 6.8%   • Wears glasses      Past Surgical History   Procedure Laterality Date   • Other surgical history       TVT   • Endoscopy  09/23/2014     Distal esophageal ring dilated 50 English   • Endoscopy N/A 12/30/2016     Procedure: ESOPHAGOGASTRODUODENOSCOPY WITH ANESTHESIA;  Surgeon: Maged Rios MD;  Location: [facility] ENDOSCOPY;  Service:    • Cholecystectomy  2007     Laparoscopic       No current facility-administered medications on file prior to encounter.      Current Outpatient Prescriptions on File Prior to Encounter   Medication Sig Dispense Refill   • BuPROPion HCl (WELLBUTRIN PO) Take 300 mg by mouth Daily.     • ergocalciferol (ERGOCALCIFEROL) 54052 UNITS capsule Take 1 capsule by mouth 1 (One) Time Per Week. (Patient taking differently: Take 50,000 Units by mouth 1 (One) Time Per Week. Tuesday) 4 capsule 2   • esomeprazole (nexIUM) 40 MG capsule Take 40 mg by mouth Every Morning Before Breakfast.     • valsartan-hydrochlorothiazide (DIOVAN HCT) 80-12.5 MG per tablet Take 1 tablet by mouth Daily.     • Vortioxetine HBr (TRINTELLIX) 10 MG tablet Take 10 mg by mouth Daily.     • Albiglutide (TANZEUM) 50 MG pen-injector Inject 50 mg under the skin 1 (One) Time Per Week. Tuesday       Allergies   Allergen Reactions   • Metformin And Related Nausea Only     Social History   Substance Use Topics   • Smoking status: Former Smoker     Packs/day: 0.50     Years: 3.00     Types: Cigarettes     Quit date: 2012   • Smokeless tobacco: Never Used   • Alcohol use No     Family History   Problem Relation Age of Onset   • Colon polyps Father    • Hypertension Father    • Hypertension Mother    • Hypertension Sister    • Cancer Maternal Grandmother    • Heart disease Paternal  Grandfather    • Colon cancer Neg Hx      Review of Systems   Constitution: Negative for chills, fever, night sweats and weight loss.   HENT: Negative for congestion, headaches and hearing loss.    Eyes: Negative for blurred vision and pain.   Cardiovascular: Positive for chest pain. Negative for claudication, dyspnea on exertion, irregular heartbeat, leg swelling, orthopnea, palpitations, paroxysmal nocturnal dyspnea and syncope.   Respiratory: Negative for cough, hemoptysis, shortness of breath and wheezing.    Endocrine: Negative for cold intolerance, heat intolerance, polydipsia and polyuria.   Hematologic/Lymphatic: Negative for adenopathy and bleeding problem. Does not bruise/bleed easily.   Skin: Negative for color change, poor wound healing and rash.   Musculoskeletal: Negative for arthritis, back pain, joint pain, joint swelling, myalgias and neck pain.   Gastrointestinal: Positive for nausea and vomiting. Negative for abdominal pain, change in bowel habit, constipation, diarrhea, heartburn, hematochezia and melena.   Genitourinary: Negative for bladder incontinence, dysuria, frequency, hematuria and nocturia.   Neurological: Negative for dizziness, focal weakness, light-headedness, loss of balance, numbness and seizures.   Psychiatric/Behavioral: Negative for altered mental status, memory loss and substance abuse.   Allergic/Immunologic: Negative for hives and persistent infections.     Diagnostic Data:    Lab Results   Component Value Date    WBC 8.22 03/16/2017    HGB 13.3 03/16/2017    HCT 39.5 03/16/2017    MCV 76.7 (L) 03/16/2017     03/16/2017     Lab Results   Component Value Date    GLUCOSE 181 (H) 03/16/2017    CALCIUM 10.0 03/16/2017     03/16/2017    K 3.4 (L) 03/16/2017    CO2 22.0 (L) 03/16/2017    CL 97 (L) 03/16/2017    BUN 16 03/16/2017    CREATININE 0.64 03/16/2017    EGFRIFNONA 101 03/16/2017    BCR 25.0 03/16/2017    ANIONGAP 16.0 (H) 03/16/2017     Troponin negative on  consecutive checks overnight    ECG: Normal sinus rhythm, poor R-wave progression, no acute changes.    CT angiogram of the chest on 3/16/17: No pulmonary embolism, no acute lung disease.    Stress Echocardiogram:  · Normal baseline ECG noted.  · The patient experienced no angina during the stress test.  · There was no ST segment deviation noted during stress.  · Segment augmentation had a normal response to stress.  · Normal stress echo with no significant echocardiographic evidence for myocardial ischemia.    ASSESSMENT/PLAN:    1.  Atypical chest pain  2.  Type II diabetes mellitus  3.  Essential hypertension  4.  Mixed hyperlipidemia    - The patient ruled out for myocardial infarction overnight.  Her chest discomfort is best described as atypical chest pain, lasting days in duration, relieved with nitroglycerin.  She underwent stress echocardiography earlier today.  Her stress ECG showed no significant ST segment changes.  Clinically, the stress test was negative for chest discomfort.  Post stress images showed normal myocardial wall motion without any stress-induced wall motion abnormalities.  Altogether, this is thought to be a low risk stress echocardiogram.    - I did discuss, that the patient, given her history of type II diabetes mellitus, hypertension, hyperlipidemia, former tobacco use, she is likely at some increased risk of having cardiovascular disease, however, with her stress test findings, she is considered stratified to a lower risk category at this time.    - I did encourage the patient to call or return should she have increasing chest discomfort, chest discomfort that is more severe, last longer, new or unusual associated symptoms.  The patient voiced her understanding is that she would certainly call or return should she have any recurrence of this type of chest discomfort.    - Temporally, the patient relates starting new medications with the onset of her chest discomfort.  This may or may  not be related but is certainly a consideration.    - At this time, no further invasive cardiac workup seems indicated, therefore we will sign off.  Otherwise, unless further workup is thought necessary by the primary service, the patient is likely stable for discharge.    - Thank you for this consultation.

## 2017-03-24 ENCOUNTER — RESULTS ENCOUNTER (OUTPATIENT)
Dept: BARIATRICS/WEIGHT MGMT | Facility: CLINIC | Age: 42
End: 2017-03-24

## 2017-03-24 DIAGNOSIS — E66.01 MORBID OBESITY, UNSPECIFIED OBESITY TYPE (HCC): ICD-10-CM

## 2017-03-24 DIAGNOSIS — E66.9 OBESITY, CLASS II, BMI 35-39.9: ICD-10-CM

## 2017-03-24 DIAGNOSIS — E66.9 DIABETES MELLITUS TYPE 2 IN OBESE (HCC): ICD-10-CM

## 2017-03-24 DIAGNOSIS — E78.5 HYPERLIPIDEMIA, UNSPECIFIED HYPERLIPIDEMIA TYPE: ICD-10-CM

## 2017-03-24 DIAGNOSIS — I10 HTN (HYPERTENSION), BENIGN: ICD-10-CM

## 2017-03-24 DIAGNOSIS — E11.69 DIABETES MELLITUS TYPE 2 IN OBESE (HCC): ICD-10-CM

## 2017-04-21 ENCOUNTER — RESULTS ENCOUNTER (OUTPATIENT)
Dept: BARIATRICS/WEIGHT MGMT | Facility: CLINIC | Age: 42
End: 2017-04-21

## 2017-04-21 DIAGNOSIS — I10 HTN (HYPERTENSION), BENIGN: ICD-10-CM

## 2017-04-21 DIAGNOSIS — E66.9 DIABETES MELLITUS TYPE 2 IN OBESE (HCC): ICD-10-CM

## 2017-04-21 DIAGNOSIS — E78.5 HYPERLIPIDEMIA, UNSPECIFIED HYPERLIPIDEMIA TYPE: ICD-10-CM

## 2017-04-21 DIAGNOSIS — E66.01 MORBID OBESITY, UNSPECIFIED OBESITY TYPE (HCC): ICD-10-CM

## 2017-04-21 DIAGNOSIS — E66.9 OBESITY, CLASS II, BMI 35-39.9: ICD-10-CM

## 2017-04-21 DIAGNOSIS — E11.69 DIABETES MELLITUS TYPE 2 IN OBESE (HCC): ICD-10-CM

## 2017-05-19 ENCOUNTER — RESULTS ENCOUNTER (OUTPATIENT)
Dept: BARIATRICS/WEIGHT MGMT | Facility: CLINIC | Age: 42
End: 2017-05-19

## 2017-05-19 DIAGNOSIS — I10 HTN (HYPERTENSION), BENIGN: ICD-10-CM

## 2017-05-19 DIAGNOSIS — E11.69 DIABETES MELLITUS TYPE 2 IN OBESE (HCC): ICD-10-CM

## 2017-05-19 DIAGNOSIS — E66.9 OBESITY, CLASS II, BMI 35-39.9: ICD-10-CM

## 2017-05-19 DIAGNOSIS — E78.5 HYPERLIPIDEMIA, UNSPECIFIED HYPERLIPIDEMIA TYPE: ICD-10-CM

## 2017-05-19 DIAGNOSIS — E66.9 DIABETES MELLITUS TYPE 2 IN OBESE (HCC): ICD-10-CM

## 2017-05-19 DIAGNOSIS — E66.01 MORBID OBESITY, UNSPECIFIED OBESITY TYPE (HCC): ICD-10-CM

## 2017-06-16 ENCOUNTER — RESULTS ENCOUNTER (OUTPATIENT)
Dept: BARIATRICS/WEIGHT MGMT | Facility: CLINIC | Age: 42
End: 2017-06-16

## 2017-06-16 DIAGNOSIS — E66.01 MORBID OBESITY, UNSPECIFIED OBESITY TYPE (HCC): ICD-10-CM

## 2017-06-16 DIAGNOSIS — E66.9 OBESITY, CLASS II, BMI 35-39.9: ICD-10-CM

## 2017-06-16 DIAGNOSIS — E11.69 DIABETES MELLITUS TYPE 2 IN OBESE (HCC): ICD-10-CM

## 2017-06-16 DIAGNOSIS — E66.9 DIABETES MELLITUS TYPE 2 IN OBESE (HCC): ICD-10-CM

## 2017-06-16 DIAGNOSIS — I10 HTN (HYPERTENSION), BENIGN: ICD-10-CM

## 2017-06-16 DIAGNOSIS — E78.5 HYPERLIPIDEMIA, UNSPECIFIED HYPERLIPIDEMIA TYPE: ICD-10-CM

## 2017-07-14 ENCOUNTER — RESULTS ENCOUNTER (OUTPATIENT)
Dept: BARIATRICS/WEIGHT MGMT | Facility: CLINIC | Age: 42
End: 2017-07-14

## 2017-07-14 DIAGNOSIS — E78.5 HYPERLIPIDEMIA, UNSPECIFIED HYPERLIPIDEMIA TYPE: ICD-10-CM

## 2017-07-14 DIAGNOSIS — E66.9 DIABETES MELLITUS TYPE 2 IN OBESE (HCC): ICD-10-CM

## 2017-07-14 DIAGNOSIS — E66.01 MORBID OBESITY, UNSPECIFIED OBESITY TYPE (HCC): ICD-10-CM

## 2017-07-14 DIAGNOSIS — E11.69 DIABETES MELLITUS TYPE 2 IN OBESE (HCC): ICD-10-CM

## 2017-07-14 DIAGNOSIS — I10 HTN (HYPERTENSION), BENIGN: ICD-10-CM

## 2017-07-14 DIAGNOSIS — E66.9 OBESITY, CLASS II, BMI 35-39.9: ICD-10-CM

## 2017-08-11 ENCOUNTER — RESULTS ENCOUNTER (OUTPATIENT)
Dept: BARIATRICS/WEIGHT MGMT | Facility: CLINIC | Age: 42
End: 2017-08-11

## 2017-08-11 DIAGNOSIS — E66.01 MORBID OBESITY, UNSPECIFIED OBESITY TYPE (HCC): ICD-10-CM

## 2017-08-11 DIAGNOSIS — I10 HTN (HYPERTENSION), BENIGN: ICD-10-CM

## 2017-08-11 DIAGNOSIS — E11.69 DIABETES MELLITUS TYPE 2 IN OBESE (HCC): ICD-10-CM

## 2017-08-11 DIAGNOSIS — E66.9 OBESITY, CLASS II, BMI 35-39.9: ICD-10-CM

## 2017-08-11 DIAGNOSIS — E66.9 DIABETES MELLITUS TYPE 2 IN OBESE (HCC): ICD-10-CM

## 2017-08-11 DIAGNOSIS — E78.5 HYPERLIPIDEMIA, UNSPECIFIED HYPERLIPIDEMIA TYPE: ICD-10-CM

## 2017-09-08 ENCOUNTER — RESULTS ENCOUNTER (OUTPATIENT)
Dept: BARIATRICS/WEIGHT MGMT | Facility: CLINIC | Age: 42
End: 2017-09-08

## 2017-09-08 DIAGNOSIS — E11.69 DIABETES MELLITUS TYPE 2 IN OBESE (HCC): ICD-10-CM

## 2017-09-08 DIAGNOSIS — E66.9 OBESITY, CLASS II, BMI 35-39.9: ICD-10-CM

## 2017-09-08 DIAGNOSIS — E66.01 MORBID OBESITY, UNSPECIFIED OBESITY TYPE (HCC): ICD-10-CM

## 2017-09-08 DIAGNOSIS — E66.9 DIABETES MELLITUS TYPE 2 IN OBESE (HCC): ICD-10-CM

## 2017-09-08 DIAGNOSIS — I10 HTN (HYPERTENSION), BENIGN: ICD-10-CM

## 2017-09-08 DIAGNOSIS — E78.5 HYPERLIPIDEMIA, UNSPECIFIED HYPERLIPIDEMIA TYPE: ICD-10-CM

## 2017-10-06 ENCOUNTER — RESULTS ENCOUNTER (OUTPATIENT)
Dept: BARIATRICS/WEIGHT MGMT | Facility: CLINIC | Age: 42
End: 2017-10-06

## 2017-10-06 DIAGNOSIS — E66.9 OBESITY, CLASS II, BMI 35-39.9: ICD-10-CM

## 2017-10-06 DIAGNOSIS — E78.5 HYPERLIPIDEMIA, UNSPECIFIED HYPERLIPIDEMIA TYPE: ICD-10-CM

## 2017-10-06 DIAGNOSIS — E66.9 DIABETES MELLITUS TYPE 2 IN OBESE (HCC): ICD-10-CM

## 2017-10-06 DIAGNOSIS — I10 HTN (HYPERTENSION), BENIGN: ICD-10-CM

## 2017-10-06 DIAGNOSIS — E66.01 MORBID OBESITY, UNSPECIFIED OBESITY TYPE (HCC): ICD-10-CM

## 2017-10-06 DIAGNOSIS — E11.69 DIABETES MELLITUS TYPE 2 IN OBESE (HCC): ICD-10-CM

## 2017-11-03 ENCOUNTER — RESULTS ENCOUNTER (OUTPATIENT)
Dept: BARIATRICS/WEIGHT MGMT | Facility: CLINIC | Age: 42
End: 2017-11-03

## 2017-11-03 DIAGNOSIS — E66.9 OBESITY, CLASS II, BMI 35-39.9: ICD-10-CM

## 2017-11-03 DIAGNOSIS — E66.01 MORBID OBESITY, UNSPECIFIED OBESITY TYPE (HCC): ICD-10-CM

## 2017-11-03 DIAGNOSIS — E66.9 DIABETES MELLITUS TYPE 2 IN OBESE (HCC): ICD-10-CM

## 2017-11-03 DIAGNOSIS — E78.5 HYPERLIPIDEMIA, UNSPECIFIED HYPERLIPIDEMIA TYPE: ICD-10-CM

## 2017-11-03 DIAGNOSIS — I10 HTN (HYPERTENSION), BENIGN: ICD-10-CM

## 2017-11-03 DIAGNOSIS — E11.69 DIABETES MELLITUS TYPE 2 IN OBESE (HCC): ICD-10-CM

## 2017-12-01 ENCOUNTER — RESULTS ENCOUNTER (OUTPATIENT)
Dept: BARIATRICS/WEIGHT MGMT | Facility: CLINIC | Age: 42
End: 2017-12-01

## 2017-12-01 DIAGNOSIS — E11.69 DIABETES MELLITUS TYPE 2 IN OBESE (HCC): ICD-10-CM

## 2017-12-01 DIAGNOSIS — E78.5 HYPERLIPIDEMIA, UNSPECIFIED HYPERLIPIDEMIA TYPE: ICD-10-CM

## 2017-12-01 DIAGNOSIS — E66.9 OBESITY, CLASS II, BMI 35-39.9: ICD-10-CM

## 2017-12-01 DIAGNOSIS — I10 HTN (HYPERTENSION), BENIGN: ICD-10-CM

## 2017-12-01 DIAGNOSIS — E66.9 DIABETES MELLITUS TYPE 2 IN OBESE (HCC): ICD-10-CM

## 2017-12-01 DIAGNOSIS — E66.01 MORBID OBESITY, UNSPECIFIED OBESITY TYPE (HCC): ICD-10-CM

## 2017-12-29 ENCOUNTER — RESULTS ENCOUNTER (OUTPATIENT)
Dept: BARIATRICS/WEIGHT MGMT | Facility: CLINIC | Age: 42
End: 2017-12-29

## 2017-12-29 DIAGNOSIS — E78.5 HYPERLIPIDEMIA, UNSPECIFIED HYPERLIPIDEMIA TYPE: ICD-10-CM

## 2017-12-29 DIAGNOSIS — I10 HTN (HYPERTENSION), BENIGN: ICD-10-CM

## 2017-12-29 DIAGNOSIS — E66.9 OBESITY, CLASS II, BMI 35-39.9: ICD-10-CM

## 2017-12-29 DIAGNOSIS — E11.69 DIABETES MELLITUS TYPE 2 IN OBESE (HCC): ICD-10-CM

## 2017-12-29 DIAGNOSIS — E66.01 MORBID OBESITY, UNSPECIFIED OBESITY TYPE (HCC): ICD-10-CM

## 2017-12-29 DIAGNOSIS — E66.9 DIABETES MELLITUS TYPE 2 IN OBESE (HCC): ICD-10-CM

## 2018-01-26 ENCOUNTER — RESULTS ENCOUNTER (OUTPATIENT)
Dept: BARIATRICS/WEIGHT MGMT | Facility: CLINIC | Age: 43
End: 2018-01-26

## 2018-01-26 DIAGNOSIS — E66.01 MORBID OBESITY, UNSPECIFIED OBESITY TYPE (HCC): ICD-10-CM

## 2018-01-26 DIAGNOSIS — E66.9 DIABETES MELLITUS TYPE 2 IN OBESE (HCC): ICD-10-CM

## 2018-01-26 DIAGNOSIS — E66.9 OBESITY, CLASS II, BMI 35-39.9: ICD-10-CM

## 2018-01-26 DIAGNOSIS — I10 HTN (HYPERTENSION), BENIGN: ICD-10-CM

## 2018-01-26 DIAGNOSIS — E78.5 HYPERLIPIDEMIA, UNSPECIFIED HYPERLIPIDEMIA TYPE: ICD-10-CM

## 2018-01-26 DIAGNOSIS — E11.69 DIABETES MELLITUS TYPE 2 IN OBESE (HCC): ICD-10-CM

## 2019-12-06 ENCOUNTER — TRANSCRIBE ORDERS (OUTPATIENT)
Dept: ADMINISTRATIVE | Facility: HOSPITAL | Age: 44
End: 2019-12-06

## 2019-12-06 DIAGNOSIS — Z12.31 OTHER SCREENING MAMMOGRAM: Primary | ICD-10-CM

## 2019-12-16 ENCOUNTER — HOSPITAL ENCOUNTER (OUTPATIENT)
Dept: MAMMOGRAPHY | Facility: HOSPITAL | Age: 44
Discharge: HOME | End: 2019-12-16
Admitting: FAMILY MEDICINE

## 2019-12-16 DIAGNOSIS — Z12.31 OTHER SCREENING MAMMOGRAM: ICD-10-CM

## 2019-12-16 PROCEDURE — 77067 SCR MAMMO BI INCL CAD: CPT

## 2019-12-16 PROCEDURE — 77063 BREAST TOMOSYNTHESIS BI: CPT

## 2020-02-19 ENCOUNTER — OFFICE VISIT (OUTPATIENT)
Dept: OBSTETRICS AND GYNECOLOGY | Facility: CLINIC | Age: 45
End: 2020-02-19

## 2020-02-19 VITALS
SYSTOLIC BLOOD PRESSURE: 136 MMHG | WEIGHT: 172 LBS | BODY MASS INDEX: 30.48 KG/M2 | DIASTOLIC BLOOD PRESSURE: 70 MMHG | HEIGHT: 63 IN

## 2020-02-19 DIAGNOSIS — E11.69 DIABETES MELLITUS TYPE 2 IN OBESE (HCC): ICD-10-CM

## 2020-02-19 DIAGNOSIS — E66.9 DIABETES MELLITUS TYPE 2 IN OBESE (HCC): ICD-10-CM

## 2020-02-19 DIAGNOSIS — N93.8 DUB (DYSFUNCTIONAL UTERINE BLEEDING): ICD-10-CM

## 2020-02-19 DIAGNOSIS — Z01.419 WELL WOMAN EXAM WITH ROUTINE GYNECOLOGICAL EXAM: Primary | ICD-10-CM

## 2020-02-19 DIAGNOSIS — Z12.31 ENCOUNTER FOR SCREENING MAMMOGRAM FOR BREAST CANCER: ICD-10-CM

## 2020-02-19 PROCEDURE — 87624 HPV HI-RISK TYP POOLED RSLT: CPT | Performed by: NURSE PRACTITIONER

## 2020-02-19 PROCEDURE — G0123 SCREEN CERV/VAG THIN LAYER: HCPCS | Performed by: NURSE PRACTITIONER

## 2020-02-19 PROCEDURE — 99386 PREV VISIT NEW AGE 40-64: CPT | Performed by: NURSE PRACTITIONER

## 2020-02-19 RX ORDER — DULAGLUTIDE 1.5 MG/.5ML
1.5 INJECTION, SOLUTION SUBCUTANEOUS WEEKLY
COMMUNITY
Start: 2020-02-07

## 2020-02-19 RX ORDER — TRAZODONE HYDROCHLORIDE 100 MG/1
TABLET ORAL EVERY 24 HOURS
COMMUNITY
Start: 2019-12-27 | End: 2021-09-22

## 2020-02-19 RX ORDER — FLUOXETINE HYDROCHLORIDE 20 MG/1
20 CAPSULE ORAL EVERY 24 HOURS
COMMUNITY

## 2020-02-19 NOTE — PROGRESS NOTES
"Subjective     Ellie Gross is a 44 y.o. female    Here as a new patient for yearly checkup.  She has complaints of very heavy periods with clotting and cramping.    Gynecologic Exam   The patient's primary symptoms include vaginal bleeding. The patient's pertinent negatives include no pelvic pain or vaginal discharge. The patient is experiencing no pain. Pertinent negatives include no abdominal pain, anorexia, back pain, chills, constipation, diarrhea, discolored urine, dysuria, fever, flank pain, frequency, headaches, hematuria, joint pain, joint swelling, nausea, painful intercourse, rash, sore throat, urgency or vomiting. The vaginal discharge was bloody. The vaginal bleeding is heavier than menses. She has been passing clots. She has not been passing tissue. She is sexually active. She uses vasectomy for contraception. Her menstrual history has been irregular.         /70   Ht 160 cm (63\")   Wt 78 kg (172 lb)   LMP 02/04/2020 (Exact Date)   Breastfeeding No   BMI 30.47 kg/m²     Outpatient Encounter Medications as of 2/19/2020   Medication Sig Dispense Refill   • BuPROPion HCl (WELLBUTRIN PO) Take 300 mg by mouth Daily.     • dicyclomine (BENTYL) 20 MG tablet Take 1 tablet by mouth Every 6 (Six) Hours. 90 tablet 3   • esomeprazole (nexIUM) 40 MG capsule Take 40 mg by mouth Every Morning Before Breakfast.     • FLUoxetine (PROzac) 20 MG capsule Daily.     • pioglitazone (ACTOS) 30 MG tablet Take 30 mg by mouth Daily.     • rosuvastatin (CRESTOR) 10 MG tablet Take 10 mg by mouth Daily.     • traZODone (DESYREL) 100 MG tablet Daily.     • TRULICITY 1.5 MG/0.5ML solution pen-injector      • valsartan-hydrochlorothiazide (DIOVAN HCT) 80-12.5 MG per tablet Take 1 tablet by mouth Daily.     • ergocalciferol (ERGOCALCIFEROL) 85879 UNITS capsule Take 1 capsule by mouth 1 (One) Time Per Week. (Patient taking differently: Take 50,000 Units by mouth 1 (One) Time Per Week. Tuesday) 4 capsule 2   • " glyBURIDE (DIAbeta) 5 MG tablet Take 5 mg by mouth Daily With Breakfast.     • [DISCONTINUED] Albiglutide (TANZEUM) 50 MG pen-injector Inject 50 mg under the skin 1 (One) Time Per Week. Tuesday     • [DISCONTINUED] SAXagliptin (ONGLYZA) 5 MG tablet Take 5 mg by mouth Daily.     • [DISCONTINUED] Vortioxetine HBr (TRINTELLIX) 10 MG tablet Take 10 mg by mouth Daily.       No facility-administered encounter medications on file as of 2/19/2020.        Surgical History  Past Surgical History:   Procedure Laterality Date   • BLADDER SURGERY     • CHOLECYSTECTOMY  2007    Laparoscopic     • ENDOSCOPY  09/23/2014    Distal esophageal ring dilated 50 [place]   • ENDOSCOPY N/A 12/30/2016    Procedure: ESOPHAGOGASTRODUODENOSCOPY WITH ANESTHESIA;  Surgeon: Maged Rios MD;  Location: [facility] ENDOSCOPY;  Service:    • OTHER SURGICAL HISTORY      TVT       Family History  Family History   Problem Relation Age of Onset   • Colon polyps Father    • Hypertension Father    • Hypertension Mother    • Breast cancer Mother 68        Ductal with 1 node, chemo and radiation   • Pancreatic cancer Mother    • Renal tubular acidosis Mother    • Hypertension Sister    • Cancer Maternal Grandmother    • Breast cancer Maternal Grandmother 30   • Heart disease Paternal Grandfather    • Hypertension Sister    • Colon cancer Neg Hx    • Ovarian cancer Neg Hx    • Uterine cancer Neg Hx    • Melanoma Neg Hx    • Prostate cancer Neg Hx        The following portions of the patient's history were reviewed and updated as appropriate: allergies, current medications, past family history, past medical history, past social history, past surgical history and problem list.    Review of Systems   Constitutional: Negative for activity change, appetite change, chills, diaphoresis, fatigue, fever, unexpected weight gain and unexpected weight loss.   HENT: Negative for congestion, dental problem, drooling, ear discharge, ear pain, facial swelling, hearing loss,  mouth sores, nosebleeds, postnasal drip, rhinorrhea, sinus pressure, sneezing, sore throat, swollen glands, tinnitus, trouble swallowing and voice change.    Eyes: Negative for blurred vision, double vision, photophobia, pain, discharge, redness, itching and visual disturbance.   Respiratory: Negative for apnea, cough, choking, chest tightness, shortness of breath, wheezing and stridor.    Cardiovascular: Negative for chest pain, palpitations and leg swelling.   Gastrointestinal: Negative for abdominal distention, abdominal pain, anal bleeding, anorexia, blood in stool, constipation, diarrhea, nausea, rectal pain, vomiting, GERD and indigestion.   Endocrine: Negative for cold intolerance, heat intolerance, polydipsia, polyphagia and polyuria.   Genitourinary: Positive for menstrual problem. Negative for amenorrhea, breast discharge, breast lump, breast pain, decreased libido, decreased urine volume, difficulty urinating, dyspareunia, dysuria, flank pain, frequency, genital sores, hematuria, pelvic pain, pelvic pressure, urgency, urinary incontinence, vaginal bleeding, vaginal discharge and vaginal pain.   Musculoskeletal: Negative for arthralgias, back pain, gait problem, joint pain, joint swelling, myalgias, neck pain, neck stiffness and bursitis.   Skin: Negative for color change, dry skin and rash.   Allergic/Immunologic: Negative for environmental allergies, food allergies and immunocompromised state.   Neurological: Negative for dizziness, tremors, seizures, syncope, facial asymmetry, speech difficulty, weakness, light-headedness, numbness, headache, memory problem and confusion.   Hematological: Negative for adenopathy. Does not bruise/bleed easily.   Psychiatric/Behavioral: Positive for depressed mood. Negative for agitation, behavioral problems, decreased concentration, dysphoric mood, hallucinations, self-injury, sleep disturbance, suicidal ideas, negative for hyperactivity and stress. The patient is  nervous/anxious.        Objective   Physical Exam   Constitutional: She is oriented to person, place, and time. She appears well-developed and well-nourished. No distress.   HENT:   Head: Normocephalic.   Right Ear: External ear normal.   Left Ear: External ear normal.   Nose: Nose normal.   Mouth/Throat: Oropharynx is clear and moist.   Eyes: Conjunctivae are normal. Right eye exhibits no discharge. Left eye exhibits no discharge. No scleral icterus.   Neck: Normal range of motion. Neck supple. Carotid bruit is not present. No tracheal deviation present. No thyromegaly present.   Cardiovascular: Normal rate, regular rhythm, normal heart sounds and intact distal pulses.   No murmur heard.  Pulmonary/Chest: Effort normal and breath sounds normal. No respiratory distress. She has no wheezes. Right breast exhibits no inverted nipple, no mass, no nipple discharge, no skin change and no tenderness. Left breast exhibits no inverted nipple, no mass, no nipple discharge, no skin change and no tenderness. No breast swelling, tenderness, discharge or bleeding. Breasts are symmetrical.   Abdominal: Soft. She exhibits no distension and no mass. There is no tenderness. There is no guarding. No hernia. Hernia confirmed negative in the right inguinal area and confirmed negative in the left inguinal area.   Genitourinary: Rectum normal, vagina normal and uterus normal. Rectal exam shows no mass. No breast swelling, tenderness, discharge or bleeding. Pelvic exam was performed with patient supine. There is no rash, tenderness, lesion or injury on the right labia. There is no rash, tenderness, lesion or injury on the left labia. Uterus is not enlarged, not fixed and not tender. Cervix exhibits no motion tenderness, no discharge and no friability. Right adnexum displays no mass, no tenderness and no fullness. Left adnexum displays no mass, no tenderness and no fullness. No erythema, tenderness or bleeding in the vagina. No foreign body  in the vagina. No signs of injury around the vagina. No vaginal discharge found.   Genitourinary Comments:   BSU normal  Urethral meatus  Normal  Perineum  Normal   Musculoskeletal: Normal range of motion. She exhibits no edema or tenderness.   Lymphadenopathy:        Head (right side): No submental, no submandibular, no tonsillar, no preauricular, no posterior auricular and no occipital adenopathy present.        Head (left side): No submental, no submandibular, no tonsillar, no preauricular, no posterior auricular and no occipital adenopathy present.     She has no cervical adenopathy.        Right cervical: No superficial cervical, no deep cervical and no posterior cervical adenopathy present.       Left cervical: No superficial cervical, no deep cervical and no posterior cervical adenopathy present.     She has no axillary adenopathy.        Right: No inguinal adenopathy present.        Left: No inguinal adenopathy present.   Neurological: She is alert and oriented to person, place, and time. Coordination normal.   Skin: Skin is warm and dry. No bruising and no rash noted. She is not diaphoretic. No erythema.   Psychiatric: She has a normal mood and affect. Her behavior is normal. Judgment and thought content normal.   Nursing note and vitals reviewed.      Assessment/Plan   Ellie was seen today for gynecologic exam.    Diagnoses and all orders for this visit:    Well woman exam with routine gynecological exam  Normal GYN exam. Will have lab work at PCP. Encouraged SBE, pt is aware how to do self breast exam and the importance of same. Discussed weight management and importance of maintaining a healthy weight. Discussed Vitamin D intake and the importance of adequate vitamin D for both Bone Health and a healthy immune system.  Discussed Daily exercise and the importance of same, in regards to a healthy heart as well as helping to maintain her weight and improving her mental health.  BMI 30.5.  Mammogram will be  scheduled at [facility].  Pap smear is done per [facility]P guidelines.  -     Liquid-based Pap Smear, Screening  -     HPV DNA Probe, Direct - ThinPrep Vial,; Future  -     HPV DNA Probe, Direct - ThinPrep Vial, Cervix    Encounter for screening mammogram for breast cancer  Comments:  Patient will have mammogram at [facility].    DUB (dysfunctional uterine bleeding)  Comments:  Patient is having heavy bleeding with clotting and cramping.  She will return for pelvic ultrasound.  She does not want to take birth control pills.  If her pelvic ultrasound is normal then we will try Lysteda.    Diabetes mellitus type 2 in obese (CMS/[facility])  Comments:  Patient is followed by Dr. No.  Her last A1c was 5.6.         Patient's Body mass index is 30.47 kg/m². BMI is above normal parameters. Recommendations include: educational material, exercise counseling and nutrition counseling.      Yun Rain, APRN  2/19/2020

## 2020-02-19 NOTE — PATIENT INSTRUCTIONS

## 2020-02-20 LAB
GEN CATEG CVX/VAG CYTO-IMP: NORMAL
HPV I/H RISK 4 DNA CVX QL PROBE+SIG AMP: NOT DETECTED
LAB AP CASE REPORT: NORMAL
LAB AP GYN ADDITIONAL INFORMATION: NORMAL
LAB AP GYN OTHER FINDINGS: NORMAL
PATH INTERP SPEC-IMP: NORMAL
STAT OF ADQ CVX/VAG CYTO-IMP: NORMAL

## 2020-03-12 ENCOUNTER — OFFICE VISIT (OUTPATIENT)
Dept: OBSTETRICS AND GYNECOLOGY | Facility: CLINIC | Age: 45
End: 2020-03-12

## 2020-03-12 VITALS
BODY MASS INDEX: 31.18 KG/M2 | DIASTOLIC BLOOD PRESSURE: 72 MMHG | HEIGHT: 63 IN | WEIGHT: 176 LBS | SYSTOLIC BLOOD PRESSURE: 118 MMHG

## 2020-03-12 DIAGNOSIS — N92.0 MENORRHAGIA WITH REGULAR CYCLE: Primary | ICD-10-CM

## 2020-03-12 PROCEDURE — 99213 OFFICE O/P EST LOW 20 MIN: CPT | Performed by: NURSE PRACTITIONER

## 2020-03-12 RX ORDER — TRANEXAMIC ACID 650 MG/1
1 TABLET ORAL 3 TIMES DAILY
Qty: 30 TABLET | Refills: 3 | Status: SHIPPED | OUTPATIENT
Start: 2020-03-12 | End: 2021-09-22

## 2020-03-12 RX ORDER — ALPRAZOLAM 0.25 MG/1
0.25 TABLET ORAL DAILY PRN
COMMUNITY

## 2020-03-12 NOTE — PROGRESS NOTES
"Subjective     Ellie Gross is a 44 y.o. female    Patient is here for follow-up of menorrhagia.  She had a pelvic ultrasound done today.    Vaginal Bleeding   The patient's primary symptoms include vaginal bleeding. The patient's pertinent negatives include no pelvic pain or vaginal discharge. The patient is experiencing no pain. Pertinent negatives include no abdominal pain, anorexia, back pain, chills, constipation, diarrhea, discolored urine, dysuria, fever, flank pain, frequency, headaches, hematuria, joint pain, joint swelling, nausea, painful intercourse, rash, sore throat, urgency or vomiting. The vaginal bleeding is heavier than menses. She has been passing clots. She has not been passing tissue.         /72   Ht 160 cm (63\")   Wt 79.8 kg (176 lb)   LMP 02/29/2020 (Exact Date)   Breastfeeding No   BMI 31.18 kg/m²     Outpatient Encounter Medications as of 3/12/2020   Medication Sig Dispense Refill   • ALPRAZolam (XANAX) 0.25 MG tablet 1 tablet     • BuPROPion HCl (WELLBUTRIN PO) Take 300 mg by mouth Daily.     • dicyclomine (BENTYL) 20 MG tablet Take 1 tablet by mouth Every 6 (Six) Hours. 90 tablet 3   • esomeprazole (nexIUM) 40 MG capsule Take 40 mg by mouth Every Morning Before Breakfast.     • FLUoxetine (PROzac) 20 MG capsule Daily.     • glyBURIDE (DIAbeta) 5 MG tablet Take 5 mg by mouth Daily With Breakfast.     • pioglitazone (ACTOS) 30 MG tablet Take 30 mg by mouth Daily.     • rosuvastatin (CRESTOR) 10 MG tablet Take 10 mg by mouth Daily.     • traZODone (DESYREL) 100 MG tablet Daily.     • TRULICITY 1.5 MG/0.5ML solution pen-injector      • valsartan-hydrochlorothiazide (DIOVAN HCT) 80-12.5 MG per tablet Take 1 tablet by mouth Daily.     • ergocalciferol (ERGOCALCIFEROL) 70588 UNITS capsule Take 1 capsule by mouth 1 (One) Time Per Week. (Patient taking differently: Take 50,000 Units by mouth 1 (One) Time Per Week. Tuesday) 4 capsule 2   • Tranexamic Acid (LYSTEDA) 650 MG tablet " Take 1 tablet by mouth 3 (Three) Times a Day. 1st 5 days of cycle. 30 tablet 3     No facility-administered encounter medications on file as of 3/12/2020.        Surgical History  Past Surgical History:   Procedure Laterality Date   • BLADDER SURGERY     • CHOLECYSTECTOMY  2007    Laparoscopic     • ENDOSCOPY  09/23/2014    Distal esophageal ring dilated 50 [place]   • ENDOSCOPY N/A 12/30/2016    Procedure: ESOPHAGOGASTRODUODENOSCOPY WITH ANESTHESIA;  Surgeon: Maged Rios MD;  Location: [facility] ENDOSCOPY;  Service:    • OTHER SURGICAL HISTORY      TVT       Family History  Family History   Problem Relation Age of Onset   • Colon polyps Father    • Hypertension Father    • Hypertension Mother    • Breast cancer Mother 68        Ductal with 1 node, chemo and radiation   • Pancreatic cancer Mother    • Renal tubular acidosis Mother    • Hypertension Sister    • Cancer Maternal Grandmother    • Breast cancer Maternal Grandmother 30   • Heart disease Paternal Grandfather    • Hypertension Sister    • Colon cancer Neg Hx    • Ovarian cancer Neg Hx    • Uterine cancer Neg Hx    • Melanoma Neg Hx    • Prostate cancer Neg Hx        The following portions of the patient's history were reviewed and updated as appropriate: allergies, current medications, past family history, past medical history, past social history, past surgical history and problem list.    Review of Systems   Constitutional: Negative for activity change, appetite change, chills, diaphoresis, fatigue, fever, unexpected weight gain and unexpected weight loss.   HENT: Negative for congestion, dental problem, drooling, ear discharge, ear pain, facial swelling, hearing loss, mouth sores, nosebleeds, postnasal drip, rhinorrhea, sinus pressure, sneezing, sore throat, swollen glands, tinnitus, trouble swallowing and voice change.    Eyes: Negative for blurred vision, double vision, photophobia, pain, discharge, redness, itching and visual disturbance.    Respiratory: Negative for apnea, cough, choking, chest tightness, shortness of breath, wheezing and stridor.    Cardiovascular: Negative for chest pain, palpitations and leg swelling.   Gastrointestinal: Negative for abdominal distention, abdominal pain, anal bleeding, anorexia, blood in stool, constipation, diarrhea, nausea, rectal pain, vomiting, GERD and indigestion.   Endocrine: Negative for cold intolerance, heat intolerance, polydipsia, polyphagia and polyuria.   Genitourinary: Positive for vaginal bleeding. Negative for amenorrhea, breast discharge, breast lump, breast pain, decreased libido, decreased urine volume, difficulty urinating, dyspareunia, dysuria, flank pain, frequency, genital sores, hematuria, menstrual problem, pelvic pain, pelvic pressure, urgency, urinary incontinence, vaginal discharge and vaginal pain.   Musculoskeletal: Negative for arthralgias, back pain, gait problem, joint pain, joint swelling, myalgias, neck pain, neck stiffness and bursitis.   Skin: Negative for color change, dry skin and rash.   Allergic/Immunologic: Negative for environmental allergies, food allergies and immunocompromised state.   Neurological: Negative for dizziness, tremors, seizures, syncope, facial asymmetry, speech difficulty, weakness, light-headedness, numbness, headache, memory problem and confusion.   Hematological: Negative for adenopathy. Does not bruise/bleed easily.   Psychiatric/Behavioral: Negative for agitation, behavioral problems, decreased concentration, dysphoric mood, hallucinations, self-injury, sleep disturbance, suicidal ideas, negative for hyperactivity, depressed mood and stress. The patient is not nervous/anxious.        Objective   Physical Exam   Constitutional: She is oriented to person, place, and time. She appears well-developed and well-nourished.   HENT:   Head: Normocephalic and atraumatic.   Eyes: Conjunctivae are normal. Right eye exhibits no discharge. Left eye exhibits no  discharge.   Neck: Normal range of motion. Neck supple. No thyromegaly present.   Cardiovascular: Normal rate, regular rhythm and normal heart sounds.   Pulmonary/Chest: Effort normal and breath sounds normal.   Neurological: She is alert and oriented to person, place, and time.   Skin: Skin is warm and dry.   Psychiatric: She has a normal mood and affect. Her behavior is normal. Judgment and thought content normal.   Nursing note and vitals reviewed.      Assessment/Plan   Ellie was seen today for vaginal bleeding.    Diagnoses and all orders for this visit:    Menorrhagia with regular cycle  Comments:  Patient is having heavy periods.  She will try Lysteda with her next cycle.  She will repeat her ultrasound after her next period because her endometrium is very thick today.  Orders:  -     Tranexamic Acid (LYSTEDA) 650 MG tablet; Take 1 tablet by mouth 3 (Three) Times a Day. 1st 5 days of cycle.         Patient's Body mass index is 31.18 kg/m². BMI is above normal parameters. Recommendations include: educational material, exercise counseling and nutrition counseling.      MALI Benton  3/12/2020

## 2020-03-12 NOTE — PATIENT INSTRUCTIONS

## 2020-06-03 ENCOUNTER — OFFICE VISIT (OUTPATIENT)
Dept: OBSTETRICS AND GYNECOLOGY | Facility: CLINIC | Age: 45
End: 2020-06-03

## 2020-06-03 VITALS
HEIGHT: 63 IN | WEIGHT: 180 LBS | DIASTOLIC BLOOD PRESSURE: 66 MMHG | SYSTOLIC BLOOD PRESSURE: 122 MMHG | BODY MASS INDEX: 31.89 KG/M2

## 2020-06-03 DIAGNOSIS — R10.2 PELVIC PAIN: Primary | ICD-10-CM

## 2020-06-03 DIAGNOSIS — N95.1 MENOPAUSAL SYMPTOMS: ICD-10-CM

## 2020-06-03 DIAGNOSIS — N93.8 DUB (DYSFUNCTIONAL UTERINE BLEEDING): ICD-10-CM

## 2020-06-03 DIAGNOSIS — E55.9 VITAMIN D DEFICIENCY: ICD-10-CM

## 2020-06-03 DIAGNOSIS — K59.00 CONSTIPATION, UNSPECIFIED CONSTIPATION TYPE: ICD-10-CM

## 2020-06-03 PROCEDURE — 99213 OFFICE O/P EST LOW 20 MIN: CPT | Performed by: NURSE PRACTITIONER

## 2020-06-03 NOTE — PATIENT INSTRUCTIONS

## 2020-06-03 NOTE — PROGRESS NOTES
Attempted to obtain health maintenance information, patient unable to provide answers for the following items Colonoscopy, Pneumococcal Vaccine, Medicare Annual Wellness Exam, HPV Vaccine, Chlamydia Screening  and Zoster Vaccine.

## 2020-06-03 NOTE — PROGRESS NOTES
"Subjective     Ellie Gross is a 44 y.o. female    Patient is here today for follow-up of heavy periods.  Her March 1 period was heavy,  March 25 was her next period it and was light.  She has not had a period since that time.  She is also had some menopausal symptoms and increased constipation.    Pelvic Pain   The patient's primary symptoms include missed menses and pelvic pain. The patient's pertinent negatives include no vaginal bleeding or vaginal discharge. This is a recurrent problem. The current episode started more than 1 month ago. The problem occurs intermittently. The problem has been waxing and waning. The pain is mild. The problem affects the right side. She is not pregnant. Associated symptoms include constipation. Pertinent negatives include no abdominal pain, anorexia, back pain, chills, diarrhea, discolored urine, dysuria, fever, flank pain, frequency, headaches, hematuria, joint pain, joint swelling, nausea, painful intercourse, rash, sore throat, urgency or vomiting. She is sexually active. Her menstrual history has been irregular.         /66   Ht 160 cm (63\")   Wt 81.6 kg (180 lb)   LMP 03/29/2020 (Exact Date) Comment: VAS  Breastfeeding No   BMI 31.89 kg/m²     Outpatient Encounter Medications as of 6/3/2020   Medication Sig Dispense Refill   • ALPRAZolam (XANAX) 0.25 MG tablet 1 tablet     • BuPROPion HCl (WELLBUTRIN PO) Take 300 mg by mouth Daily.     • dicyclomine (BENTYL) 20 MG tablet Take 1 tablet by mouth Every 6 (Six) Hours. 90 tablet 3   • esomeprazole (nexIUM) 40 MG capsule Take 40 mg by mouth Every Morning Before Breakfast.     • FLUoxetine (PROzac) 20 MG capsule Daily.     • glyBURIDE (DIAbeta) 5 MG tablet Take 5 mg by mouth Daily With Breakfast.     • pioglitazone (ACTOS) 30 MG tablet Take 30 mg by mouth Daily.     • rosuvastatin (CRESTOR) 10 MG tablet Take 10 mg by mouth Daily.     • Tranexamic Acid (LYSTEDA) 650 MG tablet Take 1 tablet by mouth 3 (Three) Times a " Day. 1st 5 days of cycle. 30 tablet 3   • TRULICITY 1.5 MG/0.5ML solution pen-injector      • valsartan-hydrochlorothiazide (DIOVAN HCT) 80-12.5 MG per tablet Take 1 tablet by mouth Daily.     • ergocalciferol (ERGOCALCIFEROL) 60443 UNITS capsule Take 1 capsule by mouth 1 (One) Time Per Week. (Patient taking differently: Take 50,000 Units by mouth 1 (One) Time Per Week. Tuesday) 4 capsule 2   • traZODone (DESYREL) 100 MG tablet Daily.       No facility-administered encounter medications on file as of 6/3/2020.        Surgical History  Past Surgical History:   Procedure Laterality Date   • BLADDER SURGERY     • CHOLECYSTECTOMY  2007    Laparoscopic     • ENDOSCOPY  09/23/2014    Distal esophageal ring dilated 50 English   • ENDOSCOPY N/A 12/30/2016    Procedure: ESOPHAGOGASTRODUODENOSCOPY WITH ANESTHESIA;  Surgeon: Maged Rios MD;  Location: [facility] ENDOSCOPY;  Service:    • OTHER SURGICAL HISTORY      TVT       Family History  Family History   Problem Relation Age of Onset   • Colon polyps Father    • Hypertension Father    • Hypertension Mother    • Breast cancer Mother 68        Ductal with 1 node, chemo and radiation   • Pancreatic cancer Mother    • Renal tubular acidosis Mother    • Hypertension Sister    • Cancer Maternal Grandmother    • Breast cancer Maternal Grandmother 30   • Heart disease Paternal Grandfather    • Hypertension Sister    • Colon cancer Neg Hx    • Ovarian cancer Neg Hx    • Uterine cancer Neg Hx    • Melanoma Neg Hx    • Prostate cancer Neg Hx        The following portions of the patient's history were reviewed and updated as appropriate: allergies, current medications, past family history, past medical history, past social history, past surgical history and problem list.    Review of Systems   Constitutional: Negative for activity change, appetite change, chills, diaphoresis, fatigue, fever, unexpected weight gain and unexpected weight loss.   HENT: Negative for congestion, dental  problem, drooling, ear discharge, ear pain, facial swelling, hearing loss, mouth sores, nosebleeds, postnasal drip, rhinorrhea, sinus pressure, sneezing, sore throat, swollen glands, tinnitus, trouble swallowing and voice change.    Eyes: Negative for blurred vision, double vision, photophobia, pain, discharge, redness, itching and visual disturbance.   Respiratory: Negative for apnea, cough, choking, chest tightness, shortness of breath, wheezing and stridor.    Cardiovascular: Negative for chest pain, palpitations and leg swelling.   Gastrointestinal: Positive for constipation. Negative for abdominal distention, abdominal pain, anal bleeding, anorexia, blood in stool, diarrhea, nausea, rectal pain, vomiting, GERD and indigestion.   Endocrine: Positive for heat intolerance. Negative for cold intolerance, polydipsia, polyphagia and polyuria.   Genitourinary: Positive for menstrual problem, missed menses and pelvic pain. Negative for amenorrhea, breast discharge, breast lump, breast pain, decreased libido, decreased urine volume, difficulty urinating, dyspareunia, dysuria, flank pain, frequency, genital sores, hematuria, pelvic pressure, urgency, urinary incontinence, vaginal bleeding, vaginal discharge and vaginal pain.   Musculoskeletal: Negative for arthralgias, back pain, gait problem, joint pain, joint swelling, myalgias, neck pain, neck stiffness and bursitis.   Skin: Negative for color change, dry skin, pallor, rash, skin lesions and bruise.   Allergic/Immunologic: Negative for environmental allergies, food allergies and immunocompromised state.   Neurological: Negative for dizziness, tremors, seizures, syncope, facial asymmetry, speech difficulty, weakness, light-headedness, numbness, headache, memory problem and confusion.   Hematological: Negative for adenopathy. Does not bruise/bleed easily.   Psychiatric/Behavioral: Negative for agitation, behavioral problems, decreased concentration, dysphoric mood,  hallucinations, self-injury, sleep disturbance, suicidal ideas, negative for hyperactivity, depressed mood and stress. The patient is not nervous/anxious.        Objective   Physical Exam   Constitutional: She is oriented to person, place, and time. She appears well-developed and well-nourished.   HENT:   Head: Normocephalic and atraumatic.   Eyes: Conjunctivae are normal. Right eye exhibits no discharge. Left eye exhibits no discharge.   Neck: Normal range of motion. Neck supple. No thyromegaly present.   Cardiovascular: Normal rate, regular rhythm and normal heart sounds.   Pulmonary/Chest: Effort normal and breath sounds normal.   Neurological: She is alert and oriented to person, place, and time.   Skin: Skin is warm and dry.   Psychiatric: She has a normal mood and affect. Her behavior is normal. Judgment and thought content normal.   Nursing note and vitals reviewed.      Assessment/Plan   Ellie was seen today for pelvic pain.    Diagnoses and all orders for this visit:    Pelvic pain  Comments:  Patient has been having pelvic pain on the right.  Pelvic ultrasound was done today and was normal.  Her left ovary was not seen due to bowel and she is constipated but she is not tender on the left.  Her endometrium is thick at 1 cm.    DUB (dysfunctional uterine bleeding)  Comments:  When I saw patient in March she was having very heavy periods but they were irregular.  She had a heavy period 1 March and then a lighter period at the end of March.  And she has not had a period since that time.    Constipation, unspecified constipation type  Comments:  Patient has increased issues with constipation.  Orders:  -     TSH  -     T3, Uptake  -     T4, Free    Menopausal symptoms  Comments:  Patient has not had a period since March she is having some hot flashes we will draw labs today.  If she is not menopausal we will try Provera to see if we can get her period started and then recheck her endometrium.  Orders:  -      Cortisol  -     DHEA-Sulfate  -     Estradiol  -     Follicle Stimulating Hormone  -     Luteinizing Hormone  -     Progesterone  -     Testosterone    Vitamin D deficiency  Comments:  Patient has had vitamin D for deficiency in the past and has stopped her vitamin D.  We will recheck it today.  Orders:  -     Vitamin D 25 Hydroxy         Patient's Body mass index is 31.89 kg/m². BMI is above normal parameters. Recommendations include: educational material, exercise counseling and nutrition counseling.      Yun Rain, APRN  6/3/2020

## 2020-06-04 DIAGNOSIS — N95.1 MENOPAUSAL SYMPTOMS: Primary | ICD-10-CM

## 2020-06-04 LAB
25(OH)D3+25(OH)D2 SERPL-MCNC: 31.8 NG/ML (ref 30–100)
CORTIS SERPL-MCNC: 12.9 UG/DL
DHEA-S SERPL-MCNC: 70.7 UG/DL (ref 41.2–243.7)
ESTRADIOL SERPL-MCNC: <5 PG/ML
FSH SERPL-ACNC: 76.5 MIU/ML
LH SERPL-ACNC: 54.9 MIU/ML
PROGEST SERPL-MCNC: 0.2 NG/ML
T3RU NFR SERPL: 24 % (ref 24–39)
T4 FREE SERPL-MCNC: 1.28 NG/DL (ref 0.93–1.7)
TESTOST SERPL-MCNC: 13 NG/DL (ref 8–48)
TSH SERPL DL<=0.005 MIU/L-ACNC: 2.76 UIU/ML (ref 0.27–4.2)

## 2020-06-04 NOTE — PROGRESS NOTES
It is up to her.  If she wants to do bioidentical we will send her levels to [facility] and they will compound it for her.  If she would rather not go that route I can just send in Prempro for her.  I like the bioidentical better but her insurance may not pay for it.  TRC

## 2020-06-04 NOTE — PROGRESS NOTES
Yes please.  But you will have to fax her labs to the original [facility] and then I will send her prescription to the [facility] they just need to know that.  I will also need her phone number and she will need to call them to give them her symptoms.  TRC

## 2020-07-21 ENCOUNTER — TELEPHONE (OUTPATIENT)
Dept: OBSTETRICS AND GYNECOLOGY | Facility: CLINIC | Age: 45
End: 2020-07-21

## 2020-07-21 NOTE — TELEPHONE ENCOUNTER
Pt was here on 6/3/20 and had not had a period since March. She had lab work showing she was menopausal and given prempro. Pt is now c/o heavy vaginal bleeding. She reports missing one pill of prempro last week. Do you want her in for US and OV? Please review.

## 2020-07-21 NOTE — TELEPHONE ENCOUNTER
It could be from her missing the Prempro.  She just had an ultrasound when she saw me on [facility] 3 and her endometrium was thick at 10 mm.  So she is probably just shedding that lining.  If it continues then she will need to call us back.  If she is changing a pad an hour she will need to call us back.  TR

## 2020-08-03 ENCOUNTER — OFFICE VISIT (OUTPATIENT)
Dept: OBSTETRICS AND GYNECOLOGY | Facility: CLINIC | Age: 45
End: 2020-08-03

## 2020-08-03 VITALS
HEIGHT: 63 IN | WEIGHT: 183 LBS | BODY MASS INDEX: 32.43 KG/M2 | SYSTOLIC BLOOD PRESSURE: 114 MMHG | DIASTOLIC BLOOD PRESSURE: 64 MMHG

## 2020-08-03 DIAGNOSIS — N95.1 MENOPAUSAL SYMPTOMS: Primary | ICD-10-CM

## 2020-08-03 DIAGNOSIS — E55.9 VITAMIN D DEFICIENCY: ICD-10-CM

## 2020-08-03 PROCEDURE — 99213 OFFICE O/P EST LOW 20 MIN: CPT | Performed by: NURSE PRACTITIONER

## 2020-08-03 RX ORDER — ERGOCALCIFEROL 1.25 MG/1
50000 CAPSULE ORAL WEEKLY
Qty: 5 CAPSULE | Refills: 3 | Status: SHIPPED | OUTPATIENT
Start: 2020-08-03 | End: 2021-09-22

## 2020-08-03 NOTE — PATIENT INSTRUCTIONS

## 2020-08-17 ENCOUNTER — TELEPHONE (OUTPATIENT)
Dept: OBSTETRICS AND GYNECOLOGY | Facility: CLINIC | Age: 45
End: 2020-08-17

## 2020-08-17 RX ORDER — MEDROXYPROGESTERONE ACETATE 2.5 MG/1
2.5 TABLET ORAL DAILY
Qty: 30 TABLET | Refills: 12 | Status: SHIPPED | OUTPATIENT
Start: 2020-08-17 | End: 2021-09-03 | Stop reason: SDUPTHER

## 2020-08-17 RX ORDER — ESTRADIOL 1 MG/1
1 TABLET ORAL DAILY
Qty: 30 TABLET | Refills: 12 | Status: SHIPPED | OUTPATIENT
Start: 2020-08-17 | End: 2021-09-03 | Stop reason: SDUPTHER

## 2020-08-17 NOTE — TELEPHONE ENCOUNTER
I will send in Estrace and Provera.  Just let her know that she has to take both of those tablets together.  There is not a cheap pill that has estrogen and progesterone both in the same tablet.  And yes the breast tenderness can be from the higher dose.  But switching to a different hormone could take care of that as well.

## 2020-08-17 NOTE — TELEPHONE ENCOUNTER
Patient calls stating the increased dose of prempro is helping with her menopausal symptoms and is needing an RX for the higher dose sent in. However patient has c/o of breast tenderness since the dose increase and didn't know if that was normal. Patient also states that at her previous visit you all had discussed a cheaper drug. Patient is wanting to do that as the Prempro is costing her $70 a month.

## 2021-08-03 ENCOUNTER — TRANSCRIBE ORDERS (OUTPATIENT)
Dept: ADMINISTRATIVE | Facility: HOSPITAL | Age: 46
End: 2021-08-03

## 2021-08-03 DIAGNOSIS — Z12.31 ENCOUNTER FOR SCREENING MAMMOGRAM FOR MALIGNANT NEOPLASM OF BREAST: Primary | ICD-10-CM

## 2021-08-24 ENCOUNTER — HOSPITAL ENCOUNTER (OUTPATIENT)
Dept: GENERAL RADIOLOGY | Facility: HOSPITAL | Age: 46
Discharge: HOME | End: 2021-08-24

## 2021-08-24 ENCOUNTER — HOSPITAL ENCOUNTER (OUTPATIENT)
Dept: MAMMOGRAPHY | Facility: HOSPITAL | Age: 46
Discharge: HOME | End: 2021-08-24

## 2021-08-24 ENCOUNTER — TRANSCRIBE ORDERS (OUTPATIENT)
Dept: ADMINISTRATIVE | Facility: HOSPITAL | Age: 46
End: 2021-08-24

## 2021-08-24 DIAGNOSIS — Z12.31 ENCOUNTER FOR SCREENING MAMMOGRAM FOR MALIGNANT NEOPLASM OF BREAST: ICD-10-CM

## 2021-08-24 DIAGNOSIS — N39.0 URINARY TRACT INFECTION WITHOUT HEMATURIA, SITE UNSPECIFIED: Primary | ICD-10-CM

## 2021-08-24 PROCEDURE — 77067 SCR MAMMO BI INCL CAD: CPT

## 2021-08-24 PROCEDURE — 74018 RADEX ABDOMEN 1 VIEW: CPT

## 2021-08-24 PROCEDURE — 77063 BREAST TOMOSYNTHESIS BI: CPT

## 2021-08-25 ENCOUNTER — TELEPHONE (OUTPATIENT)
Dept: MAMMOGRAPHY | Facility: HOSPITAL | Age: 46
End: 2021-08-25

## 2021-09-03 ENCOUNTER — TELEMEDICINE (OUTPATIENT)
Dept: OBSTETRICS AND GYNECOLOGY | Facility: CLINIC | Age: 46
End: 2021-09-03

## 2021-09-03 VITALS — HEIGHT: 63 IN | WEIGHT: 186 LBS | BODY MASS INDEX: 32.96 KG/M2

## 2021-09-03 DIAGNOSIS — N95.1 MENOPAUSAL SYMPTOMS: Primary | ICD-10-CM

## 2021-09-03 DIAGNOSIS — N95.0 POSTMENOPAUSAL BLEEDING: ICD-10-CM

## 2021-09-03 PROCEDURE — 99213 OFFICE O/P EST LOW 20 MIN: CPT | Performed by: NURSE PRACTITIONER

## 2021-09-03 RX ORDER — ESTRADIOL 1 MG/1
1 TABLET ORAL DAILY
Qty: 90 TABLET | Refills: 3 | Status: SHIPPED | OUTPATIENT
Start: 2021-09-03 | End: 2021-09-10 | Stop reason: SDUPTHER

## 2021-09-03 RX ORDER — MEDROXYPROGESTERONE ACETATE 2.5 MG/1
2.5 TABLET ORAL DAILY
Qty: 90 TABLET | Refills: 3 | Status: SHIPPED | OUTPATIENT
Start: 2021-09-03 | End: 2021-09-10 | Stop reason: SDUPTHER

## 2021-09-03 NOTE — PROGRESS NOTES
"Subjective     Ellie Gross is a 45 y.o. female    You have chosen to receive care through a telehealth visit.  Do you consent to use a video/audio connection for your medical care today? Yes      Patient called for refill on her hormones.  She has an appointment for a yearly in 2 weeks.  She did have one episode of light pink spotting approximately a month ago.  Her last period was in January 2020.        Ht 160 cm (63\")   Wt 84.4 kg (186 lb)   LMP 01/03/2020   BMI 32.95 kg/m²     Outpatient Encounter Medications as of 9/3/2021   Medication Sig Dispense Refill   • ALPRAZolam (XANAX) 0.25 MG tablet 1 tablet     • BuPROPion HCl (WELLBUTRIN PO) Take 300 mg by mouth Daily.     • dicyclomine (BENTYL) 20 MG tablet Take 1 tablet by mouth Every 6 (Six) Hours. 90 tablet 3   • esomeprazole (nexIUM) 40 MG capsule Take 40 mg by mouth Every Morning Before Breakfast.     • FLUoxetine (PROzac) 20 MG capsule Daily.     • glyBURIDE (DIAbeta) 5 MG tablet Take 5 mg by mouth Daily With Breakfast.     • pioglitazone (ACTOS) 30 MG tablet Take 30 mg by mouth Daily.     • rosuvastatin (CRESTOR) 10 MG tablet Take 10 mg by mouth Daily.     • Tranexamic Acid (LYSTEDA) 650 MG tablet Take 1 tablet by mouth 3 (Three) Times a Day. 1st 5 days of cycle. 30 tablet 3   • traZODone (DESYREL) 100 MG tablet Daily.     • TRULICITY 1.5 MG/0.5ML solution pen-injector      • valsartan-hydrochlorothiazide (DIOVAN HCT) 80-12.5 MG per tablet Take 1 tablet by mouth Daily.     • vitamin D (ERGOCALCIFEROL) 1.25 MG (96922 UT) capsule capsule Take 1 capsule by mouth 1 (One) Time Per Week. 5 capsule 3   • [DISCONTINUED] estradiol (Estrace) 1 MG tablet Take 1 tablet by mouth Daily. 30 tablet 12   • [DISCONTINUED] medroxyPROGESTERone (Provera) 2.5 MG tablet Take 1 tablet by mouth Daily. 30 tablet 12   • estradiol (Estrace) 1 MG tablet Take 1 tablet by mouth Daily. 90 tablet 3   • medroxyPROGESTERone (Provera) 2.5 MG tablet Take 1 tablet by mouth Daily. 90 " tablet 3     No facility-administered encounter medications on file as of 9/3/2021.       Surgical History  Past Surgical History:   Procedure Laterality Date   • BLADDER SURGERY     • CHOLECYSTECTOMY  2007    Laparoscopic     • ENDOSCOPY  09/23/2014    Distal esophageal ring dilated 50 Mongolian   • ENDOSCOPY N/A 12/30/2016    Procedure: ESOPHAGOGASTRODUODENOSCOPY WITH ANESTHESIA;  Surgeon: Maged Rios MD;  Location: [facility] ENDOSCOPY;  Service:    • OTHER SURGICAL HISTORY      TVT       Family History  Family History   Problem Relation Age of Onset   • Colon polyps Father    • Hypertension Father    • Hypertension Mother    • Breast cancer Mother 68        Ductal with 1 node, chemo and radiation   • Pancreatic cancer Mother    • Renal tubular acidosis Mother    • Hypertension Sister    • Cancer Maternal Grandmother    • Breast cancer Maternal Grandmother 30   • Heart disease Paternal Grandfather    • Hypertension Sister    • Colon cancer Neg Hx    • Ovarian cancer Neg Hx    • Uterine cancer Neg Hx    • Melanoma Neg Hx    • Prostate cancer Neg Hx        The following portions of the patient's history were reviewed and updated as appropriate: allergies, current medications, past family history, past medical history, past social history, past surgical history and problem list.    Review of Systems   Constitutional: Negative for activity change, appetite change, chills, diaphoresis, fatigue, fever, unexpected weight gain and unexpected weight loss.   HENT: Negative for congestion, dental problem, drooling, ear discharge, ear pain, facial swelling, hearing loss, mouth sores, nosebleeds, postnasal drip, rhinorrhea, sinus pressure, sneezing, sore throat, swollen glands, tinnitus, trouble swallowing and voice change.    Eyes: Negative for blurred vision, double vision, photophobia, pain, discharge, redness, itching and visual disturbance.   Respiratory: Negative for apnea, cough, choking, chest tightness, shortness of  breath, wheezing and stridor.    Cardiovascular: Negative for chest pain, palpitations and leg swelling.   Gastrointestinal: Negative for abdominal distention, abdominal pain, anal bleeding, blood in stool, constipation, diarrhea, nausea, rectal pain, vomiting, GERD and indigestion.   Endocrine: Negative for cold intolerance, heat intolerance, polydipsia, polyphagia and polyuria.   Genitourinary: Positive for vaginal bleeding. Negative for amenorrhea, breast discharge, breast lump, breast pain, decreased libido, decreased urine volume, difficulty urinating, dyspareunia, dysuria, flank pain, frequency, genital sores, hematuria, menstrual problem, pelvic pain, pelvic pressure, urgency, urinary incontinence, vaginal discharge and vaginal pain.   Musculoskeletal: Negative for arthralgias, back pain, gait problem, joint swelling, myalgias, neck pain, neck stiffness and bursitis.   Skin: Negative for color change, dry skin and rash.   Allergic/Immunologic: Negative for environmental allergies, food allergies and immunocompromised state.   Neurological: Negative for dizziness, tremors, seizures, syncope, facial asymmetry, speech difficulty, weakness, light-headedness, numbness, headache, memory problem and confusion.   Hematological: Negative for adenopathy. Does not bruise/bleed easily.   Psychiatric/Behavioral: Negative for agitation, behavioral problems, decreased concentration, dysphoric mood, hallucinations, self-injury, sleep disturbance, suicidal ideas, negative for hyperactivity, depressed mood and stress. The patient is not nervous/anxious.        Objective   Physical Exam  Vitals and nursing note reviewed.   Constitutional:       Appearance: She is well-developed.   HENT:      Head: Normocephalic and atraumatic.   Eyes:      General:         Right eye: No discharge.         Left eye: No discharge.      Conjunctiva/sclera: Conjunctivae normal.   Neck:      Thyroid: No thyromegaly.   Pulmonary:      Effort:  Pulmonary effort is normal.   Musculoskeletal:      Cervical back: Normal range of motion.   Skin:     General: Skin is warm and dry.   Neurological:      Mental Status: She is alert and oriented to person, place, and time.   Psychiatric:         Mood and Affect: Mood normal.         Behavior: Behavior normal.         Thought Content: Thought content normal.         Judgment: Judgment normal.         Assessment/Plan   Diagnoses and all orders for this visit:    1. Menopausal symptoms (Primary)  Comments:  Patient has an appointment in 2 weeks for her yearly.  She is out of her hormones.  I am changing her pharmacy to mail order per her request.  Orders:  -     medroxyPROGESTERone (Provera) 2.5 MG tablet; Take 1 tablet by mouth Daily.  Dispense: 90 tablet; Refill: 3  -     estradiol (Estrace) 1 MG tablet; Take 1 tablet by mouth Daily.  Dispense: 90 tablet; Refill: 3    2. Postmenopausal bleeding  Comments:  Patient had one episode of postmenopausal spotting.  Approximately 2 weeks ago.  It was light in color.  She will have a pelvic ultrasound when she comes in for her yearly checkup.    This was an audio and video enabled telemedicine encounter.     Patient's Body mass index is 32.95 kg/m². indicating that she is obese (BMI >30). Obesity-related health conditions include the following: none. Obesity is improving with lifestyle modifications. BMI is is above average; BMI management plan is completed. We discussed portion control and increasing exercise..      Yun Rain, APRN  9/3/2021

## 2021-09-10 DIAGNOSIS — N95.1 MENOPAUSAL SYMPTOMS: ICD-10-CM

## 2021-09-10 RX ORDER — ESTRADIOL 1 MG/1
1 TABLET ORAL DAILY
Qty: 90 TABLET | Refills: 3 | Status: SHIPPED | OUTPATIENT
Start: 2021-09-10 | End: 2021-09-22 | Stop reason: SDUPTHER

## 2021-09-10 RX ORDER — MEDROXYPROGESTERONE ACETATE 2.5 MG/1
2.5 TABLET ORAL DAILY
Qty: 90 TABLET | Refills: 3 | Status: SHIPPED | OUTPATIENT
Start: 2021-09-10 | End: 2021-09-22 | Stop reason: SDUPTHER

## 2021-09-10 NOTE — TELEPHONE ENCOUNTER
Medications sent 9/3/2021 to [facility]. Fax failed. [facility] verified they did not receive med refills.

## 2021-09-22 ENCOUNTER — OFFICE VISIT (OUTPATIENT)
Dept: OBSTETRICS AND GYNECOLOGY | Facility: CLINIC | Age: 46
End: 2021-09-22

## 2021-09-22 VITALS
BODY MASS INDEX: 32.6 KG/M2 | DIASTOLIC BLOOD PRESSURE: 86 MMHG | SYSTOLIC BLOOD PRESSURE: 128 MMHG | HEIGHT: 63 IN | WEIGHT: 184 LBS

## 2021-09-22 DIAGNOSIS — N95.0 POSTMENOPAUSAL BLEEDING: ICD-10-CM

## 2021-09-22 DIAGNOSIS — Z01.419 WELL WOMAN EXAM WITH ROUTINE GYNECOLOGICAL EXAM: Primary | ICD-10-CM

## 2021-09-22 DIAGNOSIS — Z80.3 FAMILY HISTORY OF BREAST CANCER: ICD-10-CM

## 2021-09-22 DIAGNOSIS — E66.9 OBESITY, CLASS II, BMI 35-39.9: ICD-10-CM

## 2021-09-22 DIAGNOSIS — N95.1 MENOPAUSAL SYMPTOMS: ICD-10-CM

## 2021-09-22 DIAGNOSIS — Z28.21 COVID-19 VACCINE DOSE DECLINED: ICD-10-CM

## 2021-09-22 DIAGNOSIS — Z12.31 ENCOUNTER FOR SCREENING MAMMOGRAM FOR BREAST CANCER: ICD-10-CM

## 2021-09-22 PROCEDURE — G0123 SCREEN CERV/VAG THIN LAYER: HCPCS | Performed by: NURSE PRACTITIONER

## 2021-09-22 PROCEDURE — 99396 PREV VISIT EST AGE 40-64: CPT | Performed by: NURSE PRACTITIONER

## 2021-09-22 PROCEDURE — 87624 HPV HI-RISK TYP POOLED RSLT: CPT | Performed by: NURSE PRACTITIONER

## 2021-09-22 RX ORDER — ESTRADIOL 1 MG/1
1 TABLET ORAL DAILY
Qty: 90 TABLET | Refills: 3 | Status: SHIPPED | OUTPATIENT
Start: 2021-09-22 | End: 2022-09-19

## 2021-09-22 RX ORDER — TIZANIDINE 4 MG/1
4 TABLET ORAL EVERY 6 HOURS PRN
COMMUNITY
Start: 2021-09-16 | End: 2021-10-10

## 2021-09-22 RX ORDER — MEDROXYPROGESTERONE ACETATE 2.5 MG/1
2.5 TABLET ORAL DAILY
Qty: 90 TABLET | Refills: 3 | Status: SHIPPED | OUTPATIENT
Start: 2021-09-22 | End: 2022-09-19

## 2021-09-22 RX ORDER — GLIPIZIDE 10 MG/1
10 TABLET, FILM COATED, EXTENDED RELEASE ORAL DAILY
COMMUNITY
Start: 2021-06-02

## 2021-09-22 NOTE — PROGRESS NOTES
"Subjective     Ellie Gross is a 46 y.o. female    Patient is here today for yearly checkup.  She had had 1 episode of vaginal bleeding a few weeks ago.  States it was more like spotting.  Ultrasound done today was normal.    Gynecologic Exam  The patient's primary symptoms include vaginal bleeding. The patient's pertinent negatives include no pelvic pain or vaginal discharge. The current episode started 1 to 4 weeks ago. The problem has been resolved. The patient is experiencing no pain. Pertinent negatives include no abdominal pain, anorexia, back pain, chills, constipation, diarrhea, discolored urine, dysuria, fever, flank pain, frequency, headaches, hematuria, joint pain, joint swelling, nausea, painful intercourse, rash, sore throat, urgency or vomiting. The vaginal discharge was bloody. The vaginal bleeding is spotting. She has not been passing clots. She has not been passing tissue. Nothing aggravates the symptoms. She has tried nothing for the symptoms. She is sexually active. She uses tubal ligation for contraception. She is postmenopausal.         /86   Ht 160 cm (62.99\")   Wt 83.5 kg (184 lb)   LMP 07/19/2020 (Exact Date)   BMI 32.60 kg/m²     Outpatient Encounter Medications as of 9/22/2021   Medication Sig Dispense Refill   • ALPRAZolam (XANAX) 0.25 MG tablet 1 tablet     • BuPROPion HCl (WELLBUTRIN PO) Take 300 mg by mouth Daily.     • dicyclomine (BENTYL) 20 MG tablet Take 1 tablet by mouth Every 6 (Six) Hours. 90 tablet 3   • esomeprazole (nexIUM) 40 MG capsule Take 40 mg by mouth Every Morning Before Breakfast.     • estradiol (Estrace) 1 MG tablet Take 1 tablet by mouth Daily. 90 tablet 3   • FLUoxetine (PROzac) 20 MG capsule Daily.     • glipizide (GLUCOTROL XL) 10 MG 24 hr tablet TAKE 1 TABLET DAILY WITH BREAKFAST     • medroxyPROGESTERone (Provera) 2.5 MG tablet Take 1 tablet by mouth Daily. 90 tablet 3   • pioglitazone (ACTOS) 30 MG tablet Take 30 mg by mouth Daily.     • " rosuvastatin (CRESTOR) 10 MG tablet Take 10 mg by mouth Daily.     • tiZANidine (ZANAFLEX) 4 MG tablet Take 4 mg by mouth Every 6 (Six) Hours As Needed.     • TRULICITY 1.5 MG/0.5ML solution pen-injector      • valsartan-hydrochlorothiazide (DIOVAN HCT) 80-12.5 MG per tablet Take 1 tablet by mouth Daily.     • [DISCONTINUED] estradiol (Estrace) 1 MG tablet Take 1 tablet by mouth Daily. 90 tablet 3   • [DISCONTINUED] medroxyPROGESTERone (Provera) 2.5 MG tablet Take 1 tablet by mouth Daily. 90 tablet 3   • [DISCONTINUED] glyBURIDE (DIAbeta) 5 MG tablet Take 5 mg by mouth Daily With Breakfast.     • [DISCONTINUED] Tranexamic Acid (LYSTEDA) 650 MG tablet Take 1 tablet by mouth 3 (Three) Times a Day. 1st 5 days of cycle. 30 tablet 3   • [DISCONTINUED] traZODone (DESYREL) 100 MG tablet Daily.     • [DISCONTINUED] vitamin D (ERGOCALCIFEROL) 1.25 MG (27382 UT) capsule capsule Take 1 capsule by mouth 1 (One) Time Per Week. 5 capsule 3     No facility-administered encounter medications on file as of 9/22/2021.       Surgical History  Past Surgical History:   Procedure Laterality Date   • BLADDER SURGERY     • CHOLECYSTECTOMY  2007    Laparoscopic     • ENDOSCOPY  09/23/2014    Distal esophageal ring dilated 50 French   • ENDOSCOPY N/A 12/30/2016    Procedure: ESOPHAGOGASTRODUODENOSCOPY WITH ANESTHESIA;  Surgeon: Maged Rios MD;  Location: [facility] ENDOSCOPY;  Service:    • OTHER SURGICAL HISTORY      TVT       Family History  Family History   Problem Relation Age of Onset   • Colon polyps Father    • Hypertension Father    • Hypertension Mother    • Breast cancer Mother 68        Ductal with 1 node, chemo and radiation   • Pancreatic cancer Mother    • Renal tubular acidosis Mother    • Hypertension Sister    • Cancer Maternal Grandmother    • Breast cancer Maternal Grandmother 30   • Heart disease Paternal Grandfather    • Hypertension Sister    • Colon cancer Neg Hx    • Ovarian cancer Neg Hx    • Uterine cancer Neg Hx     • Melanoma Neg Hx    • Prostate cancer Neg Hx        The following portions of the patient's history were reviewed and updated as appropriate: allergies, current medications, past family history, past medical history, past social history, past surgical history and problem list.    Review of Systems   Constitutional: Negative for activity change, appetite change, chills, diaphoresis, fatigue, fever, unexpected weight gain and unexpected weight loss.   HENT: Negative for congestion, dental problem, drooling, ear discharge, ear pain, facial swelling, hearing loss, mouth sores, nosebleeds, postnasal drip, rhinorrhea, sinus pressure, sneezing, sore throat, swollen glands, tinnitus, trouble swallowing and voice change.    Eyes: Negative for blurred vision, double vision, photophobia, pain, discharge, redness, itching and visual disturbance.   Respiratory: Negative for apnea, cough, choking, chest tightness, shortness of breath, wheezing and stridor.    Cardiovascular: Negative for chest pain, palpitations and leg swelling.   Gastrointestinal: Negative for abdominal distention, abdominal pain, anal bleeding, anorexia, blood in stool, constipation, diarrhea, nausea, rectal pain, vomiting, GERD and indigestion.   Endocrine: Negative for cold intolerance, heat intolerance, polydipsia, polyphagia and polyuria.   Genitourinary: Positive for vaginal bleeding. Negative for amenorrhea, breast discharge, breast lump, breast pain, decreased libido, decreased urine volume, difficulty urinating, dyspareunia, dysuria, flank pain, frequency, genital sores, hematuria, menstrual problem, pelvic pain, pelvic pressure, urgency, urinary incontinence, vaginal discharge and vaginal pain.   Musculoskeletal: Negative for arthralgias, back pain, gait problem, joint pain, joint swelling, myalgias, neck pain, neck stiffness and bursitis.   Skin: Negative for color change, dry skin and rash.   Allergic/Immunologic: Negative for environmental  allergies, food allergies and immunocompromised state.   Neurological: Negative for dizziness, tremors, seizures, syncope, facial asymmetry, speech difficulty, weakness, light-headedness, numbness, headache, memory problem and confusion.   Hematological: Negative for adenopathy. Does not bruise/bleed easily.   Psychiatric/Behavioral: Negative for agitation, behavioral problems, decreased concentration, dysphoric mood, hallucinations, self-injury, sleep disturbance, suicidal ideas, negative for hyperactivity, depressed mood and stress. The patient is not nervous/anxious.        Objective   Physical Exam  Vitals and nursing note reviewed. Exam conducted with a chaperone present.   Constitutional:       General: She is not in acute distress.     Appearance: She is well-developed. She is not diaphoretic.   HENT:      Head: Normocephalic.      Right Ear: External ear normal.      Left Ear: External ear normal.      Nose: Nose normal.   Eyes:      General: No scleral icterus.        Right eye: No discharge.         Left eye: No discharge.      Conjunctiva/sclera: Conjunctivae normal.      Pupils: Pupils are equal, round, and reactive to light.   Neck:      Thyroid: No thyromegaly.      Vascular: No carotid bruit.      Trachea: No tracheal deviation.   Cardiovascular:      Rate and Rhythm: Normal rate and regular rhythm.      Heart sounds: Normal heart sounds. No murmur heard.     Pulmonary:      Effort: Pulmonary effort is normal. No respiratory distress.      Breath sounds: Normal breath sounds. No wheezing.   Chest:      Breasts: Breasts are symmetrical.         Right: Normal. No swelling, bleeding, inverted nipple, mass, nipple discharge, skin change or tenderness.         Left: Normal. No swelling, bleeding, inverted nipple, mass, nipple discharge, skin change or tenderness.   Abdominal:      General: There is no distension.      Palpations: Abdomen is soft. There is no mass.      Tenderness: There is no abdominal  tenderness. There is no right CVA tenderness, left CVA tenderness or guarding.      Hernia: No hernia is present. There is no hernia in the left inguinal area or right inguinal area.   Genitourinary:     General: Normal vulva.      Exam position: Lithotomy position.      Labia:         Right: No rash, tenderness, lesion or injury.         Left: No rash, tenderness, lesion or injury.       Vagina: Normal. No signs of injury and foreign body. No vaginal discharge, erythema, tenderness or bleeding.      Cervix: Normal.      Uterus: Normal. Not enlarged, not fixed and not tender.       Adnexa: Right adnexa normal and left adnexa normal.        Right: No mass, tenderness or fullness.          Left: No mass, tenderness or fullness.        Rectum: Normal. No mass.      Comments:   BSU normal  Urethral meatus  Normal  Perineum  Normal  Musculoskeletal:         General: No tenderness. Normal range of motion.      Cervical back: Normal range of motion and neck supple.   Lymphadenopathy:      Head:      Right side of head: No submental, submandibular, tonsillar, preauricular, posterior auricular or occipital adenopathy.      Left side of head: No submental, submandibular, tonsillar, preauricular, posterior auricular or occipital adenopathy.      Cervical: No cervical adenopathy.      Right cervical: No superficial, deep or posterior cervical adenopathy.     Left cervical: No superficial, deep or posterior cervical adenopathy.      Upper Body:      Right upper body: No supraclavicular, axillary or pectoral adenopathy.      Left upper body: No supraclavicular, axillary or pectoral adenopathy.      Lower Body: No right inguinal adenopathy. No left inguinal adenopathy.   Skin:     General: Skin is warm and dry.      Findings: No bruising, erythema or rash.   Neurological:      Mental Status: She is alert and oriented to person, place, and time.      Coordination: Coordination normal.   Psychiatric:         Mood and Affect: Mood  normal.         Behavior: Behavior normal.         Thought Content: Thought content normal.         Judgment: Judgment normal.         Assessment/Plan   Diagnoses and all orders for this visit:    1. Well woman exam with routine gynecological exam (Primary)  Normal GYN exam. Will have lab work at PCP. Encouraged SBE, pt is aware how to do self breast exam and the importance of same. Discussed weight management and importance of maintaining a healthy weight. Discussed Vitamin D intake and the importance of adequate vitamin D for both Bone Health and a healthy immune system.  Discussed Daily exercise and the importance of same, in regards to a healthy heart as well as helping to maintain her weight and improving her mental health.  BMI 32.6.  Mammogram will be scheduled at [facility].  Pap smear is done per ASCCP guidelines.  -     Liquid-based Pap Smear, Screening    2. Encounter for screening mammogram for breast cancer  Comments:  Patient will have a mammogram at [facility].    3. Postmenopausal bleeding  Comments:  Patient had one episode of postmenopausal spotting.  Pelvic ultrasound was done today and her endometrium was thin at 4 mm.  If bleeding occurs again she will call back for an endometrial biopsy.    4. Menopausal symptoms  Comments:  Patient is given refill on Estrace and Provera.  Orders:  -     medroxyPROGESTERone (Provera) 2.5 MG tablet; Take 1 tablet by mouth Daily.  Dispense: 90 tablet; Refill: 3  -     estradiol (Estrace) 1 MG tablet; Take 1 tablet by mouth Daily.  Dispense: 90 tablet; Refill: 3    5. Obesity, Class II, BMI 35-39.9  Comments:  Patient's last A1c was 6.2.    6. COVID-19 vaccine dose declined  Comments:  Patient declines Covid vaccine.  We discussed the importance of same due to her diabetes.    7. Family history of breast cancer  Comments:  Patient has family history of breast cancer.  She does not want to do genetic screening at this time.         Patient's  Body mass index is 32.6 kg/m². indicating that she is obese (BMI >30). Obesity-related health conditions include the following: dyslipidemias and GERD. Obesity is unchanged. BMI is is above average; BMI management plan is completed. We discussed portion control and increasing exercise..      Yun Rain, APRN  9/22/2021

## 2021-09-29 ENCOUNTER — TRANSCRIBE ORDERS (OUTPATIENT)
Dept: ORTHOPEDIC SURGERY | Facility: CLINIC | Age: 46
End: 2021-09-29

## 2021-09-29 DIAGNOSIS — M25.561 RIGHT KNEE PAIN, UNSPECIFIED CHRONICITY: Primary | ICD-10-CM

## 2021-10-07 ENCOUNTER — OFFICE VISIT (OUTPATIENT)
Dept: ORTHOPEDIC SURGERY | Facility: CLINIC | Age: 46
End: 2021-10-07

## 2021-10-07 VITALS — WEIGHT: 188 LBS | BODY MASS INDEX: 34.6 KG/M2 | HEIGHT: 62 IN

## 2021-10-07 DIAGNOSIS — M23.91 INTERNAL DERANGEMENT OF RIGHT KNEE: ICD-10-CM

## 2021-10-07 DIAGNOSIS — M25.561 CHRONIC PAIN OF RIGHT KNEE: ICD-10-CM

## 2021-10-07 DIAGNOSIS — S83.241A ACUTE MEDIAL MENISCUS TEAR OF RIGHT KNEE, INITIAL ENCOUNTER: Primary | ICD-10-CM

## 2021-10-07 DIAGNOSIS — G89.29 CHRONIC PAIN OF RIGHT KNEE: ICD-10-CM

## 2021-10-07 PROCEDURE — 99204 OFFICE O/P NEW MOD 45 MIN: CPT | Performed by: ORTHOPAEDIC SURGERY

## 2021-10-07 RX ORDER — LOSARTAN POTASSIUM AND HYDROCHLOROTHIAZIDE 25; 100 MG/1; MG/1
1 TABLET ORAL DAILY
COMMUNITY

## 2021-10-07 RX ORDER — MELOXICAM 15 MG/1
TABLET ORAL
Qty: 30 TABLET | Refills: 3 | Status: ON HOLD | OUTPATIENT
Start: 2021-10-07 | End: 2022-01-28

## 2021-10-07 NOTE — PROGRESS NOTES
Ellie Gross is a 46 y.o. female   Primary provider:  Dionet Rodgers MD       Chief Complaint   Patient presents with   • Right Knee - Pain       HISTORY OF PRESENT ILLNESS:  Right knee pain started about 4 weeks ago, patient turned knee wrong.   Patient reports a pivoting/twisting injury approximately 4 weeks ago.  Her pain was worse with activity.  She has pain with twisting.  Initially she was in severe pain and had a difficult time bearing weight.  However, she reports that her symptoms have improved.  She now reports intermittent pain but it is severe when it occurs.  She has pain on the inside part of her right knee.  No numbness or tingling.  No fevers or chills.    Pain  This is a chronic problem. The problem occurs intermittently. The problem has been unchanged. Associated symptoms include joint swelling. Pertinent negatives include no chills or fever. Associated symptoms comments: Popping, aching. . The symptoms are aggravated by walking. She has tried acetaminophen, rest, ice and heat for the symptoms.        CONCURRENT MEDICAL HISTORY:    Past Medical History:   Diagnosis Date   • Acid reflux     on Nexium; still moderate despite PPI   • Anxiety    • Depression    • Dysphagia    • Elevated cholesterol     on medication   • Foot pain     ball of foot    • Heartburn    • Hemorrhoids    • Hypertension     on meds; runs normal at home   • IBS (irritable bowel syndrome)    • Joint pain    • Knee pain    • Snoring    • SOB (shortness of breath) on exertion     deconditioned   • Type 2 diabetes mellitus (HCC)     A1C was 6.8%   • Wears glasses        No Known Allergies      Current Outpatient Medications:   •  ALPRAZolam (XANAX) 0.25 MG tablet, 1 tablet, Disp: , Rfl:   •  BuPROPion HCl (WELLBUTRIN PO), Take 300 mg by mouth Daily., Disp: , Rfl:   •  esomeprazole (nexIUM) 40 MG capsule, Take 40 mg by mouth Every Morning Before Breakfast., Disp: , Rfl:   •  estradiol (Estrace) 1 MG tablet, Take 1  tablet by mouth Daily., Disp: 90 tablet, Rfl: 3  •  FLUoxetine (PROzac) 20 MG capsule, Daily., Disp: , Rfl:   •  glipizide (GLUCOTROL XL) 10 MG 24 hr tablet, TAKE 1 TABLET DAILY WITH BREAKFAST, Disp: , Rfl:   •  losartan-hydrochlorothiazide (HYZAAR) 100-25 MG per tablet, Take 1 tablet by mouth Daily., Disp: , Rfl:   •  medroxyPROGESTERone (Provera) 2.5 MG tablet, Take 1 tablet by mouth Daily., Disp: 90 tablet, Rfl: 3  •  pioglitazone (ACTOS) 30 MG tablet, Take 30 mg by mouth Daily., Disp: , Rfl:   •  rosuvastatin (CRESTOR) 10 MG tablet, Take 10 mg by mouth Daily., Disp: , Rfl:   •  TRULICITY 1.5 MG/0.5ML solution pen-injector, , Disp: , Rfl:   •  dicyclomine (BENTYL) 20 MG tablet, Take 1 tablet by mouth Every 6 (Six) Hours., Disp: 90 tablet, Rfl: 3  •  meloxicam (MOBIC) 15 MG tablet, 1 PO Daily with food., Disp: 30 tablet, Rfl: 3  •  tiZANidine (ZANAFLEX) 4 MG tablet, Take 4 mg by mouth Every 6 (Six) Hours As Needed., Disp: , Rfl:   •  valsartan-hydrochlorothiazide (DIOVAN HCT) 80-12.5 MG per tablet, Take 1 tablet by mouth Daily., Disp: , Rfl:     Past Surgical History:   Procedure Laterality Date   • BLADDER SURGERY     • CHOLECYSTECTOMY  2007    Laparoscopic     • ENDOSCOPY  09/23/2014    Distal esophageal ring dilated 50 French   • ENDOSCOPY N/A 12/30/2016    Procedure: ESOPHAGOGASTRODUODENOSCOPY WITH ANESTHESIA;  Surgeon: Maged Rios MD;  Location: [facility] ENDOSCOPY;  Service:    • OTHER SURGICAL HISTORY      TVT       Family History   Problem Relation Age of Onset   • Colon polyps Father    • Hypertension Father    • Hypertension Mother    • Breast cancer Mother 68        Ductal with 1 node, chemo and radiation   • Pancreatic cancer Mother    • Renal tubular acidosis Mother    • Hypertension Sister    • Cancer Maternal Grandmother    • Breast cancer Maternal Grandmother 30   • Heart disease Paternal Grandfather    • Hypertension Sister    • Colon cancer Neg Hx    • Ovarian cancer Neg Hx    • Uterine cancer  "Neg Hx    • Melanoma Neg Hx    • Prostate cancer Neg Hx        Social History     Socioeconomic History   • Marital status:    Tobacco Use   • Smoking status: Former Smoker     Packs/day: 0.50     Years: 3.00     Pack years: 1.50     Types: Cigarettes     Quit date:      Years since quittin.7   • Smokeless tobacco: Never Used   Substance and Sexual Activity   • Alcohol use: No   • Drug use: No   • Sexual activity: Yes     Partners: Male     Birth control/protection: Surgical        Review of Systems   Constitutional: Negative for chills and fever.   Respiratory: Negative.    Cardiovascular: Negative.    Musculoskeletal: Positive for joint swelling.        Joint pain   All other systems reviewed and are negative.      PHYSICAL EXAMINATION:       Ht 157.5 cm (62\")   Wt 85.3 kg (188 lb)   LMP 2020 (Exact Date)   BMI 34.39 kg/m²     Physical Exam  Constitutional:       General: She is not in acute distress.     Appearance: Normal appearance.   Pulmonary:      Effort: Pulmonary effort is normal. No respiratory distress.   Musculoskeletal:      Right knee: No effusion.      Instability Tests: Medial Crispin test positive.   Neurological:      Mental Status: She is alert and oriented to person, place, and time.         GAIT:     [x]  Normal  []  Antalgic    Assistive device: [x]  None  []  Walker     []  Crutches  []  Cane     []  Wheelchair  []  Stretcher    Right Knee Exam     Muscle Strength   The patient has normal right knee strength.    Tenderness   The patient is experiencing tenderness in the medial joint line and medial retinaculum.    Range of Motion   The patient has normal right knee ROM.    Tests   Crispin:  Medial - positive   Varus: negative Valgus: negative    Other   Erythema: absent  Sensation: normal  Pulse: present  Swelling: mild  Effusion: no effusion present                Injured right knee     2 views right knee: The knee is normally aligned and no fracture . There is mild " degenerative change in the joint effusion .  Other Result Text    Kris Scott MD - 09/16/2021   Formatting of this note might be different from the original.   Injured right knee     2 views right knee: The knee is normally aligned and no fracture . There is mild degenerative change in the joint effusion .     IMPRESSION:     1. Mild degenerative change .   2. Small joint effusion     [id number]     Date: 09/16/21   Received From: [facility]          Kris Scott MD - 09/17/2021   Formatting of this note might be different from the original.   Indication:  Medial pain, right knee.     MRI, Right Knee without Gadolinium:  The cruciate and collateral ligaments have a normal appearance.  The lateral meniscus is unremarkable and the cartilage in the lateral compartment is normal.  In the medial compartment of the knee, there is a loss of   cartilage with small areas of osteochondritis dissecans in the medial femoral condyle and marginal osteophytes.  In addition, there is signal change in the marrow.  The posterior horn of the medial meniscus has indistinct increased signal along the   superior surface near the apex.     The patella is normally seated and has a satisfactory covering of cartilage.  The tendons are unremarkable.  No muscle injury.     IMPRESSION:       1.  Small superior surface tear near the apex of the posterior horn of the medial meniscus.     2.  Loss of cartilage with early degenerative change in the medial compartment of the knee with several small areas of osteochondritis dissecans in the medial femoral condyle.     [id number]     Date: 09/17/21   [facility]           ASSESSMENT:    Diagnoses and all orders for this visit:    Acute medial meniscus tear of right knee, initial encounter    Chronic pain of right knee    Internal derangement of right knee    Other orders  -     losartan-hydrochlorothiazide (HYZAAR) 100-25 MG per tablet; Take 1 tablet by mouth  Daily.  -     meloxicam (MOBIC) 15 MG tablet; 1 PO Daily with food.          PLAN    Her injury mechanism is consistent with meniscal tear.  MRI confirms probable meniscus tear with mild early degenerative change.  She is better over the last couple of weeks.  Her symptoms are improving.    We discussed observation at this time.  Since her symptoms are improving we discussed slowly progressing activity as tolerated.  We also discussed using meloxicam for anti-inflammatory medication to try to help mitigate the symptoms.  We discussed the possibility of a steroid injection depending on how her symptoms change.  We also discussed the possibility of arthroscopy depending on how her symptoms change.    Slowly increase activity as pain allows.  Recheck in 4 to 6 weeks to monitor the changes in activity and symptomatology.    Return in about 6 weeks (around 11/18/2021) for recheck.    Dionte Lopez MD

## 2021-11-19 ENCOUNTER — OFFICE VISIT (OUTPATIENT)
Dept: ORTHOPEDIC SURGERY | Facility: CLINIC | Age: 46
End: 2021-11-19

## 2021-11-19 VITALS — WEIGHT: 188 LBS | HEIGHT: 62 IN | BODY MASS INDEX: 34.6 KG/M2

## 2021-11-19 DIAGNOSIS — S83.241D ACUTE MEDIAL MENISCUS TEAR OF RIGHT KNEE, SUBSEQUENT ENCOUNTER: Primary | ICD-10-CM

## 2021-11-19 DIAGNOSIS — G89.29 CHRONIC PAIN OF RIGHT KNEE: ICD-10-CM

## 2021-11-19 DIAGNOSIS — M23.91 INTERNAL DERANGEMENT OF RIGHT KNEE: ICD-10-CM

## 2021-11-19 DIAGNOSIS — M25.561 CHRONIC PAIN OF RIGHT KNEE: ICD-10-CM

## 2021-11-19 PROCEDURE — 99213 OFFICE O/P EST LOW 20 MIN: CPT | Performed by: ORTHOPAEDIC SURGERY

## 2021-11-19 NOTE — PROGRESS NOTES
"Ellie Gross is a 46 y.o. female returns for     Chief Complaint   Patient presents with   • Right Knee - Follow-up       HISTORY OF PRESENT ILLNESS:  Patient in for follow up on right knee pain  Patient reports, her knee is doing much better and she is not having pain.  She did 3 sessions in physical therapy.  She is doing home exercises.  Her pain is much better.       CONCURRENT MEDICAL HISTORY:    The following portions of the patient's history were reviewed and updated as appropriate: allergies, current medications, past family history, past medical history, past social history, past surgical history and problem list.     ROS  No fevers or chills.  No chest pain or shortness of air.  No GI or  disturbances.    PHYSICAL EXAMINATION:       Ht 157.5 cm (62\")   Wt 85.3 kg (188 lb)   LMP 07/19/2020 (Exact Date)   BMI 34.39 kg/m²     Physical Exam  Constitutional:       General: She is not in acute distress.     Appearance: Normal appearance.   Pulmonary:      Effort: Pulmonary effort is normal. No respiratory distress.   Musculoskeletal:      Right knee: No effusion.      Instability Tests: Medial Crispin test negative.   Neurological:      Mental Status: She is alert and oriented to person, place, and time.         GAIT:     []  Normal  []  Antalgic    Assistive device: [x]  None  []  Walker     []  Crutches  []  Cane     []  Wheelchair  []  Stretcher    Right Knee Exam     Muscle Strength   The patient has normal right knee strength.    Tenderness   The patient is experiencing tenderness in the medial joint line.    Range of Motion   The patient has normal right knee ROM.    Tests   Crispin:  Medial - negative   Varus: negative Valgus: negative    Other   Erythema: absent  Sensation: normal  Pulse: present  Swelling: none  Effusion: no effusion present                      ASSESSMENT:    Diagnoses and all orders for this visit:    Chronic pain of right knee    Internal derangement of right " knee    Acute medial meniscus tear of right knee, subsequent encounter          PLAN    She still has tenderness with certain motion and still has tenderness along the medial joint line.  She is doing better however.  We discussed the possibility of a steroid injection we did discussed the possibility of arthroscopy depending on how her symptoms change.  Continue general exercise program and continue home exercises.  Activity as tolerated.  Follow-up as needed.    No follow-ups on file.    Dionte Lopez MD

## 2021-12-01 ENCOUNTER — TRANSCRIBE ORDERS (OUTPATIENT)
Dept: ADMINISTRATIVE | Facility: HOSPITAL | Age: 46
End: 2021-12-01

## 2021-12-01 ENCOUNTER — HOSPITAL ENCOUNTER (OUTPATIENT)
Dept: CT IMAGING | Facility: HOSPITAL | Age: 46
Discharge: HOME | End: 2021-12-01
Admitting: PHYSICIAN ASSISTANT

## 2021-12-01 DIAGNOSIS — R10.9 FLANK PAIN: Primary | ICD-10-CM

## 2021-12-01 DIAGNOSIS — N23 KIDNEY PAIN: ICD-10-CM

## 2021-12-01 PROCEDURE — 74176 CT ABD & PELVIS W/O CONTRAST: CPT

## 2021-12-22 ENCOUNTER — TRANSCRIBE ORDERS (OUTPATIENT)
Dept: ADMINISTRATIVE | Facility: HOSPITAL | Age: 46
End: 2021-12-22

## 2021-12-22 DIAGNOSIS — S22.070A COMPRESSION FRACTURE OF T9 VERTEBRA, INITIAL ENCOUNTER (HCC): ICD-10-CM

## 2021-12-22 DIAGNOSIS — E55.9 VITAMIN D DEFICIENCY, UNSPECIFIED: Primary | ICD-10-CM

## 2022-01-18 PROCEDURE — U0003 INFECTIOUS AGENT DETECTION BY NUCLEIC ACID (DNA OR RNA); SEVERE ACUTE RESPIRATORY SYNDROME CORONAVIRUS 2 (SARS-COV-2) (CORONAVIRUS DISEASE [COVID-19]), AMPLIFIED PROBE TECHNIQUE, MAKING USE OF HIGH THROUGHPUT TECHNOLOGIES AS DESCRIBED BY CMS-2020-01-R: HCPCS | Performed by: NURSE PRACTITIONER

## 2022-01-25 ENCOUNTER — APPOINTMENT (OUTPATIENT)
Dept: BONE DENSITY | Facility: HOSPITAL | Age: 47
End: 2022-01-25

## 2022-01-27 ENCOUNTER — HOSPITAL ENCOUNTER (OUTPATIENT)
Dept: CT IMAGING | Facility: HOSPITAL | Age: 47
Discharge: HOME | End: 2022-01-27
Admitting: FAMILY MEDICINE

## 2022-01-27 ENCOUNTER — TRANSCRIBE ORDERS (OUTPATIENT)
Dept: ADMINISTRATIVE | Facility: HOSPITAL | Age: 47
End: 2022-01-27

## 2022-01-27 ENCOUNTER — HOSPITAL ENCOUNTER (EMERGENCY)
Facility: HOSPITAL | Age: 47
Discharge: LEFT BEFORE DISCHARGE | End: 2022-01-27
Attending: EMERGENCY MEDICINE

## 2022-01-27 VITALS
TEMPERATURE: 98.6 F | HEART RATE: 98 BPM | WEIGHT: 184 LBS | RESPIRATION RATE: 16 BRPM | HEIGHT: 63 IN | BODY MASS INDEX: 32.6 KG/M2 | SYSTOLIC BLOOD PRESSURE: 154 MMHG | DIASTOLIC BLOOD PRESSURE: 94 MMHG | OXYGEN SATURATION: 99 %

## 2022-01-27 DIAGNOSIS — R10.9 RIGHT FLANK PAIN: Primary | ICD-10-CM

## 2022-01-27 LAB
BASOPHILS # BLD AUTO: 0.02 10*3/MM3 (ref 0–0.2)
BASOPHILS NFR BLD AUTO: 0.2 % (ref 0–1.5)
CREAT BLDA-MCNC: 0.7 MG/DL (ref 0.6–1.3)
DEPRECATED RDW RBC AUTO: 41.6 FL (ref 37–54)
EOSINOPHIL # BLD AUTO: 0.07 10*3/MM3 (ref 0–0.4)
EOSINOPHIL NFR BLD AUTO: 0.6 % (ref 0.3–6.2)
ERYTHROCYTE [DISTWIDTH] IN BLOOD BY AUTOMATED COUNT: 13.2 % (ref 12.3–15.4)
HCT VFR BLD AUTO: 37 % (ref 34–46.6)
HGB BLD-MCNC: 12.1 G/DL (ref 12–15.9)
HOLD SPECIMEN: NORMAL
HOLD SPECIMEN: NORMAL
IMM GRANULOCYTES # BLD AUTO: 0.07 10*3/MM3 (ref 0–0.05)
IMM GRANULOCYTES NFR BLD AUTO: 0.6 % (ref 0–0.5)
INR PPP: 0.99 (ref 0.91–1.09)
LYMPHOCYTES # BLD AUTO: 2.85 10*3/MM3 (ref 0.7–3.1)
LYMPHOCYTES NFR BLD AUTO: 25.9 % (ref 19.6–45.3)
MCH RBC QN AUTO: 28.1 PG (ref 26.6–33)
MCHC RBC AUTO-ENTMCNC: 32.7 G/DL (ref 31.5–35.7)
MCV RBC AUTO: 85.8 FL (ref 79–97)
MONOCYTES # BLD AUTO: 0.71 10*3/MM3 (ref 0.1–0.9)
MONOCYTES NFR BLD AUTO: 6.4 % (ref 5–12)
NEUTROPHILS NFR BLD AUTO: 66.3 % (ref 42.7–76)
NEUTROPHILS NFR BLD AUTO: 7.29 10*3/MM3 (ref 1.7–7)
NRBC BLD AUTO-RTO: 0 /100 WBC (ref 0–0.2)
PLATELET # BLD AUTO: 366 10*3/MM3 (ref 140–450)
PMV BLD AUTO: 11.6 FL (ref 6–12)
PROTHROMBIN TIME: 12.7 SECONDS (ref 11.9–14.6)
RBC # BLD AUTO: 4.31 10*6/MM3 (ref 3.77–5.28)
SARS-COV-2 RNA PNL SPEC NAA+PROBE: NOT DETECTED
WBC NRBC COR # BLD: 11.01 10*3/MM3 (ref 3.4–10.8)
WHOLE BLOOD HOLD SPECIMEN: NORMAL
WHOLE BLOOD HOLD SPECIMEN: NORMAL

## 2022-01-27 PROCEDURE — 82565 ASSAY OF CREATININE: CPT

## 2022-01-27 PROCEDURE — 85025 COMPLETE CBC W/AUTO DIFF WBC: CPT | Performed by: EMERGENCY MEDICINE

## 2022-01-27 PROCEDURE — C9803 HOPD COVID-19 SPEC COLLECT: HCPCS | Performed by: EMERGENCY MEDICINE

## 2022-01-27 PROCEDURE — 85610 PROTHROMBIN TIME: CPT | Performed by: EMERGENCY MEDICINE

## 2022-01-27 PROCEDURE — 25010000002 IOPAMIDOL 61 % SOLUTION: Performed by: FAMILY MEDICINE

## 2022-01-27 PROCEDURE — 87635 SARS-COV-2 COVID-19 AMP PRB: CPT | Performed by: EMERGENCY MEDICINE

## 2022-01-27 PROCEDURE — 99211 OFF/OP EST MAY X REQ PHY/QHP: CPT | Performed by: EMERGENCY MEDICINE

## 2022-01-27 PROCEDURE — 74177 CT ABD & PELVIS W/CONTRAST: CPT

## 2022-01-27 RX ORDER — SODIUM CHLORIDE 0.9 % (FLUSH) 0.9 %
10 SYRINGE (ML) INJECTION AS NEEDED
Status: DISCONTINUED | OUTPATIENT
Start: 2022-01-27 | End: 2022-01-27 | Stop reason: HOSPADM

## 2022-01-27 RX ADMIN — IOPAMIDOL 100 ML: 612 INJECTION, SOLUTION INTRAVENOUS at 18:10

## 2022-01-28 ENCOUNTER — HOSPITAL ENCOUNTER (OUTPATIENT)
Facility: HOSPITAL | Age: 47
Setting detail: HOSPITAL OUTPATIENT SURGERY
Discharge: HOME | End: 2022-01-28
Attending: SPECIALIST | Admitting: SPECIALIST

## 2022-01-28 ENCOUNTER — ANESTHESIA (OUTPATIENT)
Dept: PERIOP | Facility: HOSPITAL | Age: 47
End: 2022-01-28

## 2022-01-28 ENCOUNTER — ANESTHESIA EVENT (OUTPATIENT)
Dept: PERIOP | Facility: HOSPITAL | Age: 47
End: 2022-01-28

## 2022-01-28 VITALS
HEIGHT: 63 IN | HEART RATE: 90 BPM | BODY MASS INDEX: 32.27 KG/M2 | OXYGEN SATURATION: 95 % | TEMPERATURE: 98.6 F | SYSTOLIC BLOOD PRESSURE: 116 MMHG | WEIGHT: 182.1 LBS | DIASTOLIC BLOOD PRESSURE: 69 MMHG | RESPIRATION RATE: 16 BRPM

## 2022-01-28 DIAGNOSIS — K37 APPENDICITIS: ICD-10-CM

## 2022-01-28 LAB
ANION GAP SERPL CALCULATED.3IONS-SCNC: 11 MMOL/L (ref 5–15)
B-HCG UR QL: NEGATIVE
BUN SERPL-MCNC: 17 MG/DL (ref 6–20)
BUN/CREAT SERPL: 24.6 (ref 7–25)
CALCIUM SPEC-SCNC: 9.3 MG/DL (ref 8.6–10.5)
CHLORIDE SERPL-SCNC: 102 MMOL/L (ref 98–107)
CO2 SERPL-SCNC: 26 MMOL/L (ref 22–29)
CREAT SERPL-MCNC: 0.69 MG/DL (ref 0.57–1)
GFR SERPL CREATININE-BSD FRML MDRD: 91 ML/MIN/1.73
GLUCOSE BLDC GLUCOMTR-MCNC: 130 MG/DL (ref 70–130)
GLUCOSE BLDC GLUCOMTR-MCNC: 161 MG/DL (ref 70–130)
GLUCOSE SERPL-MCNC: 123 MG/DL (ref 65–99)
POTASSIUM SERPL-SCNC: 3.4 MMOL/L (ref 3.5–5.2)
QT INTERVAL: 386 MS
QTC INTERVAL: 459 MS
SODIUM SERPL-SCNC: 139 MMOL/L (ref 136–145)

## 2022-01-28 PROCEDURE — 25010000002 MIDAZOLAM PER 1 MG: Performed by: ANESTHESIOLOGY

## 2022-01-28 PROCEDURE — 93010 ELECTROCARDIOGRAM REPORT: CPT | Performed by: INTERNAL MEDICINE

## 2022-01-28 PROCEDURE — 25010000002 FENTANYL CITRATE (PF) 50 MCG/ML SOLUTION: Performed by: ANESTHESIOLOGY

## 2022-01-28 PROCEDURE — 25010000002 PROPOFOL 10 MG/ML EMULSION: Performed by: NURSE ANESTHETIST, CERTIFIED REGISTERED

## 2022-01-28 PROCEDURE — 81025 URINE PREGNANCY TEST: CPT | Performed by: SPECIALIST

## 2022-01-28 PROCEDURE — 0 CEFAZOLIN PER 500 MG: Performed by: SPECIALIST

## 2022-01-28 PROCEDURE — 88304 TISSUE EXAM BY PATHOLOGIST: CPT | Performed by: SPECIALIST

## 2022-01-28 PROCEDURE — 25010000002 ONDANSETRON PER 1 MG: Performed by: NURSE ANESTHETIST, CERTIFIED REGISTERED

## 2022-01-28 PROCEDURE — 82962 GLUCOSE BLOOD TEST: CPT

## 2022-01-28 PROCEDURE — 25010000002 FENTANYL CITRATE (PF) 100 MCG/2ML SOLUTION: Performed by: NURSE ANESTHETIST, CERTIFIED REGISTERED

## 2022-01-28 PROCEDURE — 93005 ELECTROCARDIOGRAM TRACING: CPT | Performed by: SPECIALIST

## 2022-01-28 PROCEDURE — C1889 IMPLANT/INSERT DEVICE, NOC: HCPCS | Performed by: SPECIALIST

## 2022-01-28 PROCEDURE — 80048 BASIC METABOLIC PNL TOTAL CA: CPT | Performed by: SPECIALIST

## 2022-01-28 PROCEDURE — 25010000002 ONDANSETRON PER 1 MG: Performed by: ANESTHESIOLOGY

## 2022-01-28 DEVICE — THE ECHELON, ECHELON ENDOPATH™ AND ECHELON FLEX™ FAMILIES OF ENDOSCOPIC LINEAR CUTTERS AND RELOADS ARE STERILE, SINGLE PATIENT USE INSTRUMENTS THAT SIMULTANEOUSLY CUT AND STAPLE TISSUE. THERE ARE SIX STAGGERED ROWS OF STAPLES, THREE ON EITHER SIDE OF THE CUT LINE. THE 45 MM INSTRUMENTS HAVE A STAPLE LINE THATIS APPROXIMATELY 45 MM LONG AND A CUT LINE THAT IS APPROXIMATELY 42 MM LONG. THE SHAFT CAN ROTATE FREELY IN BOTH DIRECTIONS AND AN ARTICULATION MECHANISM ON ARTICULATING INSTRUMENTS ENABLES BENDING THE DISTAL PORTIONOF THE SHAFT TO FACILITATE LATERAL ACCESS OF THE OPERATIVE SITE.THE INSTRUMENTS ARE SHIPPED WITHOUT A RELOAD AND MUST BE LOADED PRIOR TO USE. A STAPLE RETAINING CAP ON THE RELOAD PROTECTS THE STAPLE LEG POINTS DURING SHIPPING AND TRANSPORTATION. THE INSTRUMENTS’ LOCK-OUT FEATURE IS DESIGNED TO PREVENT A USED RELOAD FROM BEING REFIRED.
Type: IMPLANTABLE DEVICE | Site: ABDOMEN | Status: FUNCTIONAL
Brand: ECHELON ENDOPATH

## 2022-01-28 RX ORDER — MIDAZOLAM HYDROCHLORIDE 1 MG/ML
1 INJECTION INTRAMUSCULAR; INTRAVENOUS
Status: DISCONTINUED | OUTPATIENT
Start: 2022-01-28 | End: 2022-01-28 | Stop reason: HOSPADM

## 2022-01-28 RX ORDER — FENTANYL CITRATE 50 UG/ML
INJECTION, SOLUTION INTRAMUSCULAR; INTRAVENOUS AS NEEDED
Status: DISCONTINUED | OUTPATIENT
Start: 2022-01-28 | End: 2022-01-28 | Stop reason: SURG

## 2022-01-28 RX ORDER — SODIUM CHLORIDE, SODIUM LACTATE, POTASSIUM CHLORIDE, CALCIUM CHLORIDE 600; 310; 30; 20 MG/100ML; MG/100ML; MG/100ML; MG/100ML
1000 INJECTION, SOLUTION INTRAVENOUS CONTINUOUS
Status: DISCONTINUED | OUTPATIENT
Start: 2022-01-28 | End: 2022-01-28 | Stop reason: HOSPADM

## 2022-01-28 RX ORDER — ONDANSETRON 2 MG/ML
INJECTION INTRAMUSCULAR; INTRAVENOUS AS NEEDED
Status: DISCONTINUED | OUTPATIENT
Start: 2022-01-28 | End: 2022-01-28 | Stop reason: SURG

## 2022-01-28 RX ORDER — HYDROCODONE BITARTRATE AND ACETAMINOPHEN 7.5; 325 MG/1; MG/1
1 TABLET ORAL EVERY 4 HOURS PRN
Qty: 30 TABLET | Refills: 0 | Status: SHIPPED | OUTPATIENT
Start: 2022-01-28 | End: 2022-08-18

## 2022-01-28 RX ORDER — OXYCODONE AND ACETAMINOPHEN 10; 325 MG/1; MG/1
1 TABLET ORAL ONCE AS NEEDED
Status: COMPLETED | OUTPATIENT
Start: 2022-01-28 | End: 2022-01-28

## 2022-01-28 RX ORDER — SODIUM CHLORIDE 0.9 % (FLUSH) 0.9 %
10 SYRINGE (ML) INJECTION AS NEEDED
Status: DISCONTINUED | OUTPATIENT
Start: 2022-01-28 | End: 2022-01-28 | Stop reason: HOSPADM

## 2022-01-28 RX ORDER — SODIUM CHLORIDE 9 MG/ML
INJECTION, SOLUTION INTRAVENOUS AS NEEDED
Status: DISCONTINUED | OUTPATIENT
Start: 2022-01-28 | End: 2022-01-28 | Stop reason: HOSPADM

## 2022-01-28 RX ORDER — SODIUM CHLORIDE 0.9 % (FLUSH) 0.9 %
3 SYRINGE (ML) INJECTION EVERY 12 HOURS SCHEDULED
Status: DISCONTINUED | OUTPATIENT
Start: 2022-01-28 | End: 2022-01-28 | Stop reason: HOSPADM

## 2022-01-28 RX ORDER — FLUMAZENIL 0.1 MG/ML
0.2 INJECTION INTRAVENOUS AS NEEDED
Status: DISCONTINUED | OUTPATIENT
Start: 2022-01-28 | End: 2022-01-28 | Stop reason: HOSPADM

## 2022-01-28 RX ORDER — ONDANSETRON 2 MG/ML
4 INJECTION INTRAMUSCULAR; INTRAVENOUS ONCE AS NEEDED
Status: COMPLETED | OUTPATIENT
Start: 2022-01-28 | End: 2022-01-28

## 2022-01-28 RX ORDER — IBUPROFEN 600 MG/1
600 TABLET ORAL ONCE AS NEEDED
Status: DISCONTINUED | OUTPATIENT
Start: 2022-01-28 | End: 2022-01-28 | Stop reason: HOSPADM

## 2022-01-28 RX ORDER — LIDOCAINE HYDROCHLORIDE 10 MG/ML
0.5 INJECTION, SOLUTION EPIDURAL; INFILTRATION; INTRACAUDAL; PERINEURAL ONCE AS NEEDED
Status: DISCONTINUED | OUTPATIENT
Start: 2022-01-28 | End: 2022-01-28 | Stop reason: HOSPADM

## 2022-01-28 RX ORDER — SODIUM CHLORIDE, SODIUM LACTATE, POTASSIUM CHLORIDE, CALCIUM CHLORIDE 600; 310; 30; 20 MG/100ML; MG/100ML; MG/100ML; MG/100ML
100 INJECTION, SOLUTION INTRAVENOUS CONTINUOUS
Status: DISCONTINUED | OUTPATIENT
Start: 2022-01-28 | End: 2022-01-28 | Stop reason: HOSPADM

## 2022-01-28 RX ORDER — PROPOFOL 10 MG/ML
VIAL (ML) INTRAVENOUS AS NEEDED
Status: DISCONTINUED | OUTPATIENT
Start: 2022-01-28 | End: 2022-01-28 | Stop reason: SURG

## 2022-01-28 RX ORDER — OXYCODONE AND ACETAMINOPHEN 7.5; 325 MG/1; MG/1
2 TABLET ORAL EVERY 4 HOURS PRN
Status: DISCONTINUED | OUTPATIENT
Start: 2022-01-28 | End: 2022-01-28 | Stop reason: HOSPADM

## 2022-01-28 RX ORDER — ROCURONIUM BROMIDE 10 MG/ML
INJECTION, SOLUTION INTRAVENOUS AS NEEDED
Status: DISCONTINUED | OUTPATIENT
Start: 2022-01-28 | End: 2022-01-28 | Stop reason: SURG

## 2022-01-28 RX ORDER — FENTANYL CITRATE 50 UG/ML
25 INJECTION, SOLUTION INTRAMUSCULAR; INTRAVENOUS
Status: DISCONTINUED | OUTPATIENT
Start: 2022-01-28 | End: 2022-01-28 | Stop reason: HOSPADM

## 2022-01-28 RX ORDER — SCOLOPAMINE TRANSDERMAL SYSTEM 1 MG/1
1 PATCH, EXTENDED RELEASE TRANSDERMAL CONTINUOUS
Status: DISCONTINUED | OUTPATIENT
Start: 2022-01-28 | End: 2022-01-28 | Stop reason: HOSPADM

## 2022-01-28 RX ORDER — NALOXONE HCL 0.4 MG/ML
0.4 VIAL (ML) INJECTION AS NEEDED
Status: DISCONTINUED | OUTPATIENT
Start: 2022-01-28 | End: 2022-01-28 | Stop reason: HOSPADM

## 2022-01-28 RX ORDER — SUCCINYLCHOLINE/SOD CL,ISO/PF 200MG/10ML
SYRINGE (ML) INTRAVENOUS AS NEEDED
Status: DISCONTINUED | OUTPATIENT
Start: 2022-01-28 | End: 2022-01-28 | Stop reason: SURG

## 2022-01-28 RX ORDER — ACETAMINOPHEN 500 MG
1000 TABLET ORAL ONCE
Status: COMPLETED | OUTPATIENT
Start: 2022-01-28 | End: 2022-01-28

## 2022-01-28 RX ORDER — LABETALOL HYDROCHLORIDE 5 MG/ML
5 INJECTION, SOLUTION INTRAVENOUS
Status: DISCONTINUED | OUTPATIENT
Start: 2022-01-28 | End: 2022-01-28 | Stop reason: HOSPADM

## 2022-01-28 RX ORDER — SODIUM CHLORIDE 0.9 % (FLUSH) 0.9 %
3-10 SYRINGE (ML) INJECTION AS NEEDED
Status: DISCONTINUED | OUTPATIENT
Start: 2022-01-28 | End: 2022-01-28 | Stop reason: HOSPADM

## 2022-01-28 RX ORDER — SODIUM CHLORIDE 0.9 % (FLUSH) 0.9 %
3 SYRINGE (ML) INJECTION AS NEEDED
Status: DISCONTINUED | OUTPATIENT
Start: 2022-01-28 | End: 2022-01-28 | Stop reason: HOSPADM

## 2022-01-28 RX ORDER — LIDOCAINE HYDROCHLORIDE 20 MG/ML
INJECTION, SOLUTION EPIDURAL; INFILTRATION; INTRACAUDAL; PERINEURAL AS NEEDED
Status: DISCONTINUED | OUTPATIENT
Start: 2022-01-28 | End: 2022-01-28 | Stop reason: SURG

## 2022-01-28 RX ORDER — BUPIVACAINE HYDROCHLORIDE AND EPINEPHRINE 5; 5 MG/ML; UG/ML
INJECTION, SOLUTION PERINEURAL AS NEEDED
Status: DISCONTINUED | OUTPATIENT
Start: 2022-01-28 | End: 2022-01-28 | Stop reason: HOSPADM

## 2022-01-28 RX ADMIN — SODIUM CHLORIDE, POTASSIUM CHLORIDE, SODIUM LACTATE AND CALCIUM CHLORIDE 1000 ML: 600; 310; 30; 20 INJECTION, SOLUTION INTRAVENOUS at 06:57

## 2022-01-28 RX ADMIN — SUGAMMADEX 200 MG: 100 INJECTION, SOLUTION INTRAVENOUS at 08:54

## 2022-01-28 RX ADMIN — ACETAMINOPHEN 1000 MG: 500 TABLET ORAL at 07:49

## 2022-01-28 RX ADMIN — FENTANYL CITRATE 50 MCG: 50 INJECTION, SOLUTION INTRAMUSCULAR; INTRAVENOUS at 08:07

## 2022-01-28 RX ADMIN — FENTANYL CITRATE 25 MCG: 50 INJECTION, SOLUTION INTRAMUSCULAR; INTRAVENOUS at 09:24

## 2022-01-28 RX ADMIN — ONDANSETRON 4 MG: 2 INJECTION INTRAMUSCULAR; INTRAVENOUS at 09:24

## 2022-01-28 RX ADMIN — ROCURONIUM BROMIDE 30 MG: 10 INJECTION INTRAVENOUS at 08:13

## 2022-01-28 RX ADMIN — PROPOFOL 200 MG: 10 INJECTION, EMULSION INTRAVENOUS at 08:07

## 2022-01-28 RX ADMIN — SCOPALAMINE 1 PATCH: 1 PATCH, EXTENDED RELEASE TRANSDERMAL at 07:49

## 2022-01-28 RX ADMIN — FENTANYL CITRATE 50 MCG: 50 INJECTION, SOLUTION INTRAMUSCULAR; INTRAVENOUS at 08:12

## 2022-01-28 RX ADMIN — ONDANSETRON 4 MG: 2 INJECTION INTRAMUSCULAR; INTRAVENOUS at 08:34

## 2022-01-28 RX ADMIN — OXYCODONE AND ACETAMINOPHEN 1 TABLET: 325; 10 TABLET ORAL at 09:30

## 2022-01-28 RX ADMIN — FENTANYL CITRATE 25 MCG: 50 INJECTION, SOLUTION INTRAMUSCULAR; INTRAVENOUS at 09:16

## 2022-01-28 RX ADMIN — Medication 120 MG: at 08:07

## 2022-01-28 RX ADMIN — CEFAZOLIN 2 G: 1 INJECTION, POWDER, FOR SOLUTION INTRAMUSCULAR; INTRAVENOUS at 08:01

## 2022-01-28 RX ADMIN — ROCURONIUM BROMIDE 10 MG: 10 INJECTION INTRAVENOUS at 08:07

## 2022-01-28 RX ADMIN — LIDOCAINE HYDROCHLORIDE 100 MG: 20 INJECTION, SOLUTION EPIDURAL; INFILTRATION; INTRACAUDAL; PERINEURAL at 08:07

## 2022-01-28 RX ADMIN — FENTANYL CITRATE 25 MCG: 50 INJECTION, SOLUTION INTRAMUSCULAR; INTRAVENOUS at 09:30

## 2022-01-28 RX ADMIN — MIDAZOLAM 1 MG: 1 INJECTION INTRAMUSCULAR; INTRAVENOUS at 07:49

## 2022-01-28 NOTE — ANESTHESIA PREPROCEDURE EVALUATION
Anesthesia Evaluation     Patient summary reviewed   history of anesthetic complications: PONV  NPO Solid Status: > 6 hours  NPO Liquid Status: > 6 hours           Airway   Mallampati: II  no difficulty expected  Dental - normal exam     Pulmonary - negative pulmonary ROS   Cardiovascular   Exercise tolerance: good (4-7 METS)    (+) hypertension, hyperlipidemia,   (-) pacemaker, valvular problems/murmurs, past MI, CABG      Neuro/Psych  (+) psychiatric history Depression,     GI/Hepatic/Renal/Endo    (+) obesity,  GERD,  diabetes mellitus,     Musculoskeletal     Abdominal    Substance History - negative use     OB/GYN          Other   arthritis,                        Anesthesia Plan    ASA 2     general     intravenous induction     Anesthetic plan, all risks, benefits, and alternatives have been provided, discussed and informed consent has been obtained with: patient.

## 2022-01-28 NOTE — ANESTHESIA PROCEDURE NOTES
Airway  Urgency: elective    Date/Time: 1/28/2022 8:09 AM  Airway not difficult    General Information and Staff    Patient location during procedure: OR  CRNA: Charanjit Conklin CRNA    Indications and Patient Condition  Indications for airway management: airway protection    Preoxygenated: yes  MILS maintained throughout  Mask difficulty assessment: 1 - vent by mask    Final Airway Details  Final airway type: endotracheal airway      Successful airway: ETT  Cuffed: yes   Successful intubation technique: video laryngoscopy  Facilitating devices/methods: intubating stylet  Endotracheal tube insertion site: oral  Blade: Milagros  Blade size: 3  ETT size (mm): 7.0  Cormack-Lehane Classification: grade I - full view of glottis  Placement verified by: chest auscultation and capnometry   Cuff volume (mL): 8  Measured from: lips  ETT/EBT  to lips (cm): 21  Number of attempts at approach: 1  Assessment: lips, teeth, and gum same as pre-op and atraumatic intubation

## 2022-01-28 NOTE — ANESTHESIA POSTPROCEDURE EVALUATION
"Patient: Ellie Gross    Procedure Summary     Date: 01/28/22 Room / Location:  [facility] OR  /  [facility]    Anesthesia Start: 0801 Anesthesia Stop: 0903    Procedure: APPENDECTOMY LAPAROSCOPIC (N/A Abdomen) Diagnosis: (APPENDICITIS)    Surgeons: Cely Smyth MD Provider: Charanjit Conklin CRNA    Anesthesia Type: general ASA Status: 2          Anesthesia Type: general    Vitals  Vitals Value Taken Time   /66 01/28/22 0952   Temp 98.6 °F (37 °C) 01/28/22 0952   Pulse 90 01/28/22 0952   Resp 11 01/28/22 0952   SpO2 97 % 01/28/22 0952           Post Anesthesia Care and Evaluation    Patient location during evaluation: PACU  Patient participation: complete - patient participated  Level of consciousness: awake and alert  Pain management: adequate  Airway patency: patent  Anesthetic complications: No anesthetic complications  PONV Status: none  Cardiovascular status: acceptable and hemodynamically stable  Respiratory status: acceptable  Hydration status: acceptable    Comments: Blood pressure 116/69, pulse 90, temperature 98.6 °F (37 °C), temperature source Temporal, resp. rate 16, height 160 cm (62.99\"), weight 82.6 kg (182 lb 1.6 oz), last menstrual period 07/19/2020, SpO2 95 %, not currently breastfeeding.    Patient discharged from PACU based upon Claudia score. Please see RN notes for further details      "

## 2022-01-31 LAB
CYTO UR: NORMAL
LAB AP CASE REPORT: NORMAL
PATH REPORT.FINAL DX SPEC: NORMAL
PATH REPORT.GROSS SPEC: NORMAL

## 2022-02-07 ENCOUNTER — HOSPITAL ENCOUNTER (OUTPATIENT)
Dept: BONE DENSITY | Facility: HOSPITAL | Age: 47
Discharge: HOME | End: 2022-02-07
Admitting: FAMILY MEDICINE

## 2022-02-07 DIAGNOSIS — S22.070A COMPRESSION FRACTURE OF T9 VERTEBRA, INITIAL ENCOUNTER: ICD-10-CM

## 2022-02-07 DIAGNOSIS — E55.9 VITAMIN D DEFICIENCY, UNSPECIFIED: ICD-10-CM

## 2022-02-07 PROCEDURE — 77080 DXA BONE DENSITY AXIAL: CPT

## 2022-08-18 ENCOUNTER — OFFICE VISIT (OUTPATIENT)
Dept: ORTHOPEDIC SURGERY | Facility: CLINIC | Age: 47
End: 2022-08-18

## 2022-08-18 VITALS — BODY MASS INDEX: 32.05 KG/M2 | WEIGHT: 180.9 LBS | HEIGHT: 63 IN

## 2022-08-18 DIAGNOSIS — G89.29 CHRONIC PAIN OF RIGHT KNEE: ICD-10-CM

## 2022-08-18 DIAGNOSIS — E11.9 TYPE 2 DIABETES MELLITUS WITHOUT COMPLICATION, WITHOUT LONG-TERM CURRENT USE OF INSULIN: ICD-10-CM

## 2022-08-18 DIAGNOSIS — I10 HTN (HYPERTENSION), BENIGN: ICD-10-CM

## 2022-08-18 DIAGNOSIS — M25.561 CHRONIC PAIN OF RIGHT KNEE: ICD-10-CM

## 2022-08-18 DIAGNOSIS — M23.91 INTERNAL DERANGEMENT OF RIGHT KNEE: ICD-10-CM

## 2022-08-18 DIAGNOSIS — S83.241D ACUTE MEDIAL MENISCUS TEAR OF RIGHT KNEE, SUBSEQUENT ENCOUNTER: Primary | ICD-10-CM

## 2022-08-18 PROCEDURE — 99214 OFFICE O/P EST MOD 30 MIN: CPT | Performed by: ORTHOPAEDIC SURGERY

## 2022-08-18 RX ORDER — FENOFIBRATE 145 MG/1
145 TABLET, COATED ORAL DAILY
COMMUNITY

## 2022-08-18 NOTE — PROGRESS NOTES
Ellie Gross is a 46 y.o. female returns for     Chief Complaint   Patient presents with   • Right Knee - Follow-up, Pain       HISTORY OF PRESENT ILLNESS:  The patient initially did really well and progressed with activity.  She had done home exercises and continue to progress for a long period of time.  However, over the last 3 months, she has had increasing pain in her right knee again.  She has pain with pivoting and twisting.  She has pain with deep knee bends.  She has tried anti-inflammatory medicine as well as activity modification without significant improvement.  She reports no new injury.     CONCURRENT MEDICAL HISTORY:    Past Medical History:   Diagnosis Date   • Acid reflux     on Nexium; still moderate despite PPI   • Anxiety    • Depression    • Dysphagia    • Elevated cholesterol     on medication   • Foot pain     ball of foot    • Heartburn    • Hemorrhoids    • Hypertension     on meds; runs normal at home   • IBS (irritable bowel syndrome)    • Joint pain    • Knee pain    • PONV (postoperative nausea and vomiting)    • Snoring    • SOB (shortness of breath) on exertion     deconditioned   • Type 2 diabetes mellitus (HCC)     A1C was 6.8%   • Wears glasses        No Known Allergies    Current Outpatient Medications on File Prior to Visit   Medication Sig   • ALPRAZolam (XANAX) 0.25 MG tablet Take 0.25 mg by mouth Daily As Needed.   • BuPROPion HCl (WELLBUTRIN PO) Take 300 mg by mouth Daily.   • esomeprazole (nexIUM) 40 MG capsule Take 40 mg by mouth Every Morning Before Breakfast.   • estradiol (Estrace) 1 MG tablet Take 1 tablet by mouth Daily.   • fenofibrate (TRICOR) 145 MG tablet Take 145 mg by mouth Daily.   • FLUoxetine (PROzac) 20 MG capsule Daily.   • glipizide (GLUCOTROL XL) 10 MG 24 hr tablet TAKE 1 TABLET DAILY WITH BREAKFAST   • losartan-hydrochlorothiazide (HYZAAR) 100-25 MG per tablet Take 1 tablet by mouth Daily.   • medroxyPROGESTERone (Provera) 2.5 MG tablet Take 1 tablet  by mouth Daily.   • pioglitazone (ACTOS) 30 MG tablet Take 30 mg by mouth Daily.   • rosuvastatin (CRESTOR) 10 MG tablet Take 10 mg by mouth Daily.   • TRULICITY 1.5 MG/0.5ML solution pen-injector 1 (One) Time Per Week. Takes on Sundays     No current facility-administered medications on file prior to visit.       Past Surgical History:   Procedure Laterality Date   • APPENDECTOMY N/A 1/28/2022    Procedure: APPENDECTOMY LAPAROSCOPIC;  Surgeon: Cely Smyth MD;  Location: [facility] OR;  Service: General;  Laterality: N/A;   • BLADDER SURGERY     • CHOLECYSTECTOMY  2007    Laparoscopic     • ENDOSCOPY  09/23/2014    Distal esophageal ring dilated 50 Serbian   • ENDOSCOPY N/A 12/30/2016    Procedure: ESOPHAGOGASTRODUODENOSCOPY WITH ANESTHESIA;  Surgeon: Maged Rios MD;  Location: [facility] ENDOSCOPY;  Service:    • OTHER SURGICAL HISTORY      TVT       Family History   Problem Relation Age of Onset   • Colon polyps Father    • Hypertension Father    • Hypertension Mother    • Breast cancer Mother 68        Ductal with 1 node, chemo and radiation   • Pancreatic cancer Mother    • Renal tubular acidosis Mother    • Hypertension Sister    • Cancer Maternal Grandmother    • Breast cancer Maternal Grandmother 30   • Heart disease Paternal Grandfather    • Hypertension Sister    • Colon cancer Neg Hx    • Ovarian cancer Neg Hx    • Uterine cancer Neg Hx    • Melanoma Neg Hx    • Prostate cancer Neg Hx        Social History     Socioeconomic History   • Marital status:    Tobacco Use   • Smoking status: Former Smoker     Packs/day: 0.50     Years: 3.00     Pack years: 1.50     Types: Cigarettes     Quit date: 2012     Years since quitting: 10.6   • Smokeless tobacco: Never Used   Vaping Use   • Vaping Use: Never used   Substance and Sexual Activity   • Alcohol use: No   • Drug use: No   • Sexual activity: Yes     Partners: Male     Birth control/protection: Surgical           ROS  No fevers or chills.  No chest pain  "or shortness of air.  No GI or  disturbances.  Other than right knee pain, all other systems reviewed as negative.    PHYSICAL EXAMINATION:       Ht 160 cm (62.99\")   Wt 82.1 kg (180 lb 14.4 oz)   BMI 32.06 kg/m²     Physical Exam  Vitals reviewed.   Constitutional:       General: She is not in acute distress.     Appearance: Normal appearance. She is well-developed.   Cardiovascular:      Rate and Rhythm: Normal rate and regular rhythm.      Heart sounds: Normal heart sounds.   Pulmonary:      Effort: Pulmonary effort is normal.      Breath sounds: Normal breath sounds.   Abdominal:      General: Bowel sounds are normal.      Palpations: Abdomen is soft.   Musculoskeletal:      Right knee:      Instability Tests: Medial Crispin test positive.   Neurological:      Mental Status: She is alert and oriented to person, place, and time.   Psychiatric:         Behavior: Behavior normal.         Thought Content: Thought content normal.         Judgment: Judgment normal.         GAIT:     []  Normal  [x]  Antalgic    Assistive device: [x]  None  []  Walker     []  Crutches  []  Cane     []  Wheelchair  []  Stretcher    Right Knee Exam     Muscle Strength   The patient has normal right knee strength.    Tenderness   The patient is experiencing tenderness in the medial joint line and medial retinaculum.    Range of Motion   Extension: 0   Flexion: 110     Tests   Crispin:  Medial - positive   Varus: negative Valgus: negative    Other   Erythema: absent  Sensation: normal  Pulse: present  Swelling: mild                Impression        1.  Small superior surface tear near the apex of the posterior horn of the medial meniscus.     2.  Loss of cartilage with early degenerative change in the medial compartment of the knee with several small areas of osteochondritis dissecans in the medial femoral condyle.     [id number]    Narrative    Indication:  Medial pain, right knee.     MRI, Right Knee without Gadolinium:  The " cruciate and collateral ligaments have a normal appearance.  The lateral meniscus is unremarkable and the cartilage in the lateral compartment is normal.  In the medial compartment of the knee, there is a loss of   cartilage with small areas of osteochondritis dissecans in the medial femoral condyle and marginal osteophytes.  In addition, there is signal change in the marrow.  The posterior horn of the medial meniscus has indistinct increased signal along the   superior surface near the apex.     The patella is normally seated and has a satisfactory covering of cartilage.  The tendons are unremarkable.  No muscle injury.  Exam End: 09/17/21 15:18    Specimen Collected: 09/17/21 15:39 Last Resulted: 09/17/21 16:19   Received From: [facility]  Result Received: 08/18/22 08:36     Received Information      XR Knee 1 or 2 View Right    Result Date: 8/18/2022  Narrative: Ordering Provider:  Dionte Lopez MD Ordering Diagnosis/Indication:  Internal derangement of right knee, Acute medial meniscus tear of right knee, subsequent encounter, Chronic pain of right knee Procedure:  XR KNEE 1 OR 2 VW RIGHT Exam Date:  8/18/22 COMPARISON:  Not applicable, no relevant images available.     Impression:  AP bilateral standing of the knees with lateral of the right knee show acceptable position and alignment of both knees with no evidence of acute bony abnormality.  Mild varus positioning is noted in both knees.  She has mild to moderate medial joint space narrowing in the right knee.  Approximately 45 to 50% medial joint space narrowing.  No significant arthritic change noted in the patellofemoral compartment and the articular surfaces remain smooth on the lateral x-ray as well.  No acute findings.  Mild arthritic changes noted in the left knee with varus positioning and a small marginal osteophyte along the medial joint space. Dionte Lopez MD 8/18/22     XR Knee Bilateral AP Standing    Result Date:  8/18/2022  Narrative: Ordering Provider:  Dionte Lopez MD Ordering Diagnosis/Indication:  Internal derangement of right knee, Acute medial meniscus tear of right knee, subsequent encounter, Chronic pain of right knee Procedure:  XR KNEE BILATERAL AP STANDING Exam Date:  8/18/22 COMPARISON:  Not applicable, no relevant images available.     Impression:  AP bilateral standing of the knees with lateral of the right knee show acceptable position and alignment of both knees with no evidence of acute bony abnormality.  Mild varus positioning is noted in both knees.  She has mild to moderate medial joint space narrowing in the right knee.  Approximately 45 to 50% medial joint space narrowing.  No significant arthritic change noted in the patellofemoral compartment and the articular surfaces remain smooth on the lateral x-ray as well.  No acute findings.  Mild arthritic changes noted in the left knee with varus positioning and a small marginal osteophyte along the medial joint space. Dionte Lopez MD 8/18/22             ASSESSMENT:    Diagnoses and all orders for this visit:    Acute medial meniscus tear of right knee, subsequent encounter  -     XR Knee 1 or 2 View Right  -     XR Knee Bilateral AP Standing  -     Case Request; Standing  -     Case Request    Chronic pain of right knee  -     XR Knee 1 or 2 View Right  -     XR Knee Bilateral AP Standing  -     Case Request; Standing  -     Case Request    Internal derangement of right knee  -     XR Knee 1 or 2 View Right  -     XR Knee Bilateral AP Standing  -     Case Request; Standing  -     Case Request    HTN (hypertension), benign  -     Case Request; Standing  -     Case Request    Type 2 diabetes mellitus without complication, without long-term current use of insulin (HCC)  -     Case Request; Standing  -     Case Request    Other orders  -     fenofibrate (TRICOR) 145 MG tablet; Take 145 mg by mouth Daily.  -     Follow Anesthesia Guidelines /  Standing Orders; Future  -     Provide instructions to patient regarding NPO status          PLAN    Long discussion was held with the patient regarding further treatment options.  She has an MRI that shows that she does have medial meniscus tearing.  She also shows some arthritic change on x-ray.  At the age of 46, we discussed proceeding with arthroscopy and debridement of the meniscus.  We also discussed that we know that there is arthritic change, however, her symptoms are more mechanical in nature.  We discussed arthroscopy and then slowly progressing activity as tolerated.    The patient voiced understanding of the risks, benefits, and alternative forms of treatment that were discussed and the patient consents to proceed with surgery.  All risks, benefits and alternatives were discussed.  Risks include, but not exclusive to anesthetic complications, including death, MI, CVA, infection, bleeding DVT, fracture, residual pain and need for future surgery.    This discussion was held with the patient by Dionte Lopez MD and all questions were answered.    Plan arthroscopy of the right knee with debridement of medial meniscus.    Return for Post-operative eval.    Dionte Lopez MD

## 2022-08-20 RX ORDER — BUPIVACAINE HCL/0.9 % NACL/PF 0.1 %
2 PLASTIC BAG, INJECTION (ML) EPIDURAL ONCE
Status: CANCELLED | OUTPATIENT
Start: 2022-09-26 | End: 2022-08-20

## 2022-08-23 PROBLEM — M23.91 INTERNAL DERANGEMENT OF RIGHT KNEE: Status: ACTIVE | Noted: 2022-08-23

## 2022-08-23 PROBLEM — S83.241A ACUTE MEDIAL MENISCUS TEAR OF RIGHT KNEE: Status: ACTIVE | Noted: 2022-08-23

## 2022-08-23 PROBLEM — E11.9 TYPE 2 DIABETES MELLITUS WITHOUT COMPLICATION, WITHOUT LONG-TERM CURRENT USE OF INSULIN: Status: ACTIVE | Noted: 2022-08-23

## 2022-08-24 DIAGNOSIS — N95.1 MENOPAUSAL SYMPTOMS: ICD-10-CM

## 2022-08-24 RX ORDER — ESTRADIOL 1 MG/1
1 TABLET ORAL DAILY
Qty: 30 TABLET | Refills: 11 | OUTPATIENT
Start: 2022-08-24

## 2022-08-24 RX ORDER — MEDROXYPROGESTERONE ACETATE 2.5 MG/1
2.5 TABLET ORAL DAILY
Qty: 30 TABLET | Refills: 11 | OUTPATIENT
Start: 2022-08-24

## 2022-09-02 ENCOUNTER — TELEPHONE (OUTPATIENT)
Dept: OBSTETRICS AND GYNECOLOGY | Facility: CLINIC | Age: 47
End: 2022-09-02

## 2022-09-02 NOTE — TELEPHONE ENCOUNTER
Patient called and left voicemail, I tried to call patient back and she didn't answer and I left a voicemail.

## 2022-09-19 DIAGNOSIS — N95.1 MENOPAUSAL SYMPTOMS: ICD-10-CM

## 2022-09-19 RX ORDER — MEDROXYPROGESTERONE ACETATE 2.5 MG/1
2.5 TABLET ORAL DAILY
Qty: 30 TABLET | Refills: 0 | Status: SHIPPED | OUTPATIENT
Start: 2022-09-19 | End: 2022-10-25 | Stop reason: SDUPTHER

## 2022-09-19 RX ORDER — ESTRADIOL 1 MG/1
1 TABLET ORAL DAILY
Qty: 30 TABLET | Refills: 0 | Status: SHIPPED | OUTPATIENT
Start: 2022-09-19 | End: 2022-10-25 | Stop reason: SDUPTHER

## 2022-09-20 ENCOUNTER — PRE-ADMISSION TESTING (OUTPATIENT)
Dept: PREADMISSION TESTING | Facility: HOSPITAL | Age: 47
End: 2022-09-20

## 2022-09-20 ENCOUNTER — OFFICE VISIT (OUTPATIENT)
Dept: ORTHOPEDIC SURGERY | Facility: CLINIC | Age: 47
End: 2022-09-20

## 2022-09-20 VITALS
OXYGEN SATURATION: 99 % | RESPIRATION RATE: 16 BRPM | HEART RATE: 75 BPM | DIASTOLIC BLOOD PRESSURE: 72 MMHG | SYSTOLIC BLOOD PRESSURE: 122 MMHG

## 2022-09-20 VITALS — WEIGHT: 186 LBS | HEIGHT: 63 IN | BODY MASS INDEX: 32.96 KG/M2

## 2022-09-20 DIAGNOSIS — S83.241D ACUTE MEDIAL MENISCUS TEAR OF RIGHT KNEE, SUBSEQUENT ENCOUNTER: ICD-10-CM

## 2022-09-20 DIAGNOSIS — G89.29 CHRONIC PAIN OF RIGHT KNEE: Primary | ICD-10-CM

## 2022-09-20 DIAGNOSIS — S83.241A ACUTE MEDIAL MENISCUS TEAR OF RIGHT KNEE, INITIAL ENCOUNTER: ICD-10-CM

## 2022-09-20 DIAGNOSIS — M25.561 CHRONIC PAIN OF RIGHT KNEE: Primary | ICD-10-CM

## 2022-09-20 DIAGNOSIS — M23.91 INTERNAL DERANGEMENT OF RIGHT KNEE: ICD-10-CM

## 2022-09-20 DIAGNOSIS — I10 HTN (HYPERTENSION), BENIGN: ICD-10-CM

## 2022-09-20 DIAGNOSIS — E11.9 TYPE 2 DIABETES MELLITUS WITHOUT COMPLICATION, WITHOUT LONG-TERM CURRENT USE OF INSULIN: ICD-10-CM

## 2022-09-20 LAB
ANION GAP SERPL CALCULATED.3IONS-SCNC: 11 MMOL/L (ref 5–15)
BUN SERPL-MCNC: 25 MG/DL (ref 6–20)
BUN/CREAT SERPL: 25 (ref 7–25)
CALCIUM SPEC-SCNC: 9.4 MG/DL (ref 8.6–10.5)
CHLORIDE SERPL-SCNC: 102 MMOL/L (ref 98–107)
CO2 SERPL-SCNC: 26 MMOL/L (ref 22–29)
CREAT SERPL-MCNC: 1 MG/DL (ref 0.57–1)
EGFRCR SERPLBLD CKD-EPI 2021: 69.2 ML/MIN/1.73
GLUCOSE SERPL-MCNC: 74 MG/DL (ref 65–99)
POTASSIUM SERPL-SCNC: 4.1 MMOL/L (ref 3.5–5.2)
QT INTERVAL: 398 MS
QTC INTERVAL: 444 MS
SODIUM SERPL-SCNC: 139 MMOL/L (ref 136–145)

## 2022-09-20 PROCEDURE — 80048 BASIC METABOLIC PNL TOTAL CA: CPT

## 2022-09-20 PROCEDURE — 93010 ELECTROCARDIOGRAM REPORT: CPT | Performed by: INTERNAL MEDICINE

## 2022-09-20 PROCEDURE — 93005 ELECTROCARDIOGRAM TRACING: CPT

## 2022-09-20 PROCEDURE — 36415 COLL VENOUS BLD VENIPUNCTURE: CPT

## 2022-09-20 PROCEDURE — 99024 POSTOP FOLLOW-UP VISIT: CPT | Performed by: ORTHOPAEDIC SURGERY

## 2022-09-20 RX ORDER — SODIUM CHLORIDE 9 MG/ML
1000 INJECTION, SOLUTION INTRAVENOUS CONTINUOUS
Status: CANCELLED | OUTPATIENT
Start: 2022-09-26

## 2022-09-20 NOTE — DISCHARGE INSTRUCTIONS
[facility]  Pre-op Information and Guidelines    You will be called after 2 p.m. the day before your surgery (Friday for Monday surgery) and notified of your time for arrival and approximate surgery time.  If you have not received a call by 4P.M., please contact Same Day Surgery at [phone] of if outside [facility] call [phone].    Please Follow these Important Safety Guidelines:    The morning of your procedure, take only the medications listed below with   A sip of water:_____________________________________________       ______________________________________________    DO NOT eat or drink anything after 12:00 midnight the night before surgery  Specific instructions concerning drinking clear liquids will be discussed during  the pre-surgery instruction call the day before your surgery.    If you take a blood thinner (ex. Plavix, Coumadin, aspirin), ask your doctor when to stop it before surgery  STOP DATE: _________________    Only 2 visitors are allowed in patient rooms at a time  Your visitors will be asked to wait in the lobby until the admission process is complete with the exception of a parent with a child and patients in need of special assistance.    YOU CANNOT DRIVE YOURSELF HOME  You must be accompanied by someone who will be responsible for driving you home after surgery and for your care at home.    DO NOT chew gum, use breath mints, hard candy, or smoke the day of surgery  DO NOT drink alcohol for at least 24 hours before your surgery  DO NOT wear any jewelry and remove all body piercing before coming to the hospital  DO NOT wear make-up to the hospital  If you are having surgery on an extremity (arm/leg/foot) remove nail polish/artificial nails on the surgical side  Clothing, glasses, contacts, dentures, and hairpieces must be removed before surgery  Bathe the night before or the morning of your surgery and do not use powders/lotions on skin.

## 2022-09-20 NOTE — PROGRESS NOTES
Ellie Gross is a 47 y.o. female returns for     Chief Complaint   Patient presents with   • Right Knee - Follow-up       HISTORY OF PRESENT ILLNESS:  The patient initially did really well and progressed with activity.  She had done home exercises and continue to progress for a long period of time.  However, over the last 3 months, she has had increasing pain in her right knee again.  She has pain with pivoting and twisting.  She has pain with deep knee bends.  She has tried anti-inflammatory medicine as well as activity modification without significant improvement.  She reports no new injury.     CONCURRENT MEDICAL HISTORY:    Past Medical History:   Diagnosis Date   • Acid reflux     on Nexium; still moderate despite PPI   • Anxiety    • Depression    • Dysphagia    • Elevated cholesterol     on medication   • Foot pain     ball of foot    • Heartburn    • Hemorrhoids    • Hypertension     on meds; runs normal at home   • IBS (irritable bowel syndrome)    • Joint pain    • Knee pain    • PONV (postoperative nausea and vomiting)    • Snoring    • SOB (shortness of breath) on exertion     deconditioned   • Type 2 diabetes mellitus (HCC)     A1C was 6.8%   • Wears glasses        No Known Allergies    Current Outpatient Medications on File Prior to Visit   Medication Sig   • ALPRAZolam (XANAX) 0.25 MG tablet Take 0.25 mg by mouth Daily As Needed.   • BuPROPion HCl (WELLBUTRIN PO) Take 300 mg by mouth Daily.   • Cholecalciferol (VITAMIN D3 PO) Take  by mouth.   • esomeprazole (nexIUM) 40 MG capsule Take 40 mg by mouth Every Morning Before Breakfast.   • estradiol (ESTRACE) 1 MG tablet TAKE 1 TABLET BY MOUTH DAILY.   • fenofibrate (TRICOR) 145 MG tablet Take 145 mg by mouth Daily.   • FLUoxetine (PROzac) 20 MG capsule Daily.   • glipizide (GLUCOTROL XL) 10 MG 24 hr tablet TAKE 1 TABLET DAILY WITH BREAKFAST   • losartan-hydrochlorothiazide (HYZAAR) 100-25 MG per tablet Take 1 tablet by mouth Daily.   •  medroxyPROGESTERone (PROVERA) 2.5 MG tablet TAKE 1 TABLET BY MOUTH DAILY.   • pioglitazone (ACTOS) 30 MG tablet Take 30 mg by mouth Daily.   • rosuvastatin (CRESTOR) 10 MG tablet Take 10 mg by mouth Daily.   • TRULICITY 1.5 MG/0.5ML solution pen-injector 1 (One) Time Per Week. Takes on Sundays     No current facility-administered medications on file prior to visit.       Past Surgical History:   Procedure Laterality Date   • APPENDECTOMY N/A 1/28/2022    Procedure: APPENDECTOMY LAPAROSCOPIC;  Surgeon: Cely Smyth MD;  Location: [facility] OR;  Service: General;  Laterality: N/A;   • BLADDER SURGERY     • CHOLECYSTECTOMY  2007    Laparoscopic     • ENDOSCOPY  09/23/2014    Distal esophageal ring dilated 50 Persian   • ENDOSCOPY N/A 12/30/2016    Procedure: ESOPHAGOGASTRODUODENOSCOPY WITH ANESTHESIA;  Surgeon: Maged Rios MD;  Location: [facility] ENDOSCOPY;  Service:    • OTHER SURGICAL HISTORY      TVT       Family History   Problem Relation Age of Onset   • Colon polyps Father    • Hypertension Father    • Hypertension Mother    • Breast cancer Mother 68        Ductal with 1 node, chemo and radiation   • Pancreatic cancer Mother    • Renal tubular acidosis Mother    • Hypertension Sister    • Cancer Maternal Grandmother    • Breast cancer Maternal Grandmother 30   • Heart disease Paternal Grandfather    • Hypertension Sister    • Colon cancer Neg Hx    • Ovarian cancer Neg Hx    • Uterine cancer Neg Hx    • Melanoma Neg Hx    • Prostate cancer Neg Hx        Social History     Socioeconomic History   • Marital status:    Tobacco Use   • Smoking status: Former Smoker     Packs/day: 0.50     Years: 3.00     Pack years: 1.50     Types: Cigarettes     Quit date: 2012     Years since quitting: 10.7   • Smokeless tobacco: Never Used   Vaping Use   • Vaping Use: Never used   Substance and Sexual Activity   • Alcohol use: No   • Drug use: No   • Sexual activity: Yes     Partners: Male     Birth control/protection:  "Surgical           ROS  No fevers or chills.  No chest pain or shortness of air.  No GI or  disturbances.  Other than right knee pain, all other systems reviewed as negative.    PHYSICAL EXAMINATION:       Ht 160 cm (63\")   Wt 84.4 kg (186 lb)   BMI 32.95 kg/m²     Physical Exam  Vitals reviewed.   Constitutional:       General: She is not in acute distress.     Appearance: Normal appearance. She is well-developed.   Cardiovascular:      Rate and Rhythm: Normal rate and regular rhythm.      Heart sounds: Normal heart sounds.   Pulmonary:      Effort: Pulmonary effort is normal.      Breath sounds: Normal breath sounds.   Abdominal:      General: Bowel sounds are normal.      Palpations: Abdomen is soft.   Musculoskeletal:      Right knee:      Instability Tests: Medial Crispin test positive.   Neurological:      Mental Status: She is alert and oriented to person, place, and time.   Psychiatric:         Behavior: Behavior normal.         Thought Content: Thought content normal.         Judgment: Judgment normal.         GAIT:     []  Normal  [x]  Antalgic    Assistive device: [x]  None  []  Walker     []  Crutches  []  Cane     []  Wheelchair  []  Stretcher    Right Knee Exam     Muscle Strength   The patient has normal right knee strength.    Tenderness   The patient is experiencing tenderness in the medial joint line and medial retinaculum.    Range of Motion   Extension: 0   Flexion: 110     Tests   Crispin:  Medial - positive   Varus: negative Valgus: negative    Other   Erythema: absent  Sensation: normal  Pulse: present  Swelling: mild                Impression        1.  Small superior surface tear near the apex of the posterior horn of the medial meniscus.     2.  Loss of cartilage with early degenerative change in the medial compartment of the knee with several small areas of osteochondritis dissecans in the medial femoral condyle.     [id number]    Narrative    Indication:  Medial pain, right knee. "     MRI, Right Knee without Gadolinium:  The cruciate and collateral ligaments have a normal appearance.  The lateral meniscus is unremarkable and the cartilage in the lateral compartment is normal.  In the medial compartment of the knee, there is a loss of   cartilage with small areas of osteochondritis dissecans in the medial femoral condyle and marginal osteophytes.  In addition, there is signal change in the marrow.  The posterior horn of the medial meniscus has indistinct increased signal along the   superior surface near the apex.     The patella is normally seated and has a satisfactory covering of cartilage.  The tendons are unremarkable.  No muscle injury.  Exam End: 09/17/21 15:18    Specimen Collected: 09/17/21 15:39 Last Resulted: 09/17/21 16:19   Received From: [facility]  Result Received: 08/18/22 08:36     Received Information      No results found.          ASSESSMENT:    Diagnoses and all orders for this visit:    Chronic pain of right knee    Internal derangement of right knee    Acute medial meniscus tear of right knee, subsequent encounter    Type 2 diabetes mellitus without complication, without long-term current use of insulin (HCC)    HTN (hypertension), benign    Acute medial meniscus tear of right knee, initial encounter    Other orders  -     Cholecalciferol (VITAMIN D3 PO); Take  by mouth.          PLAN    Long discussion was held with the patient regarding further treatment options.  She has an MRI that shows that she does have medial meniscus tearing.  She also shows some arthritic change on x-ray.  At the age of 46, we discussed proceeding with arthroscopy and debridement of the meniscus.  We also discussed that we know that there is arthritic change, however, her symptoms are more mechanical in nature.  We discussed arthroscopy and then slowly progressing activity as tolerated.    The patient voiced understanding of the risks, benefits, and alternative forms of treatment that  were discussed and the patient consents to proceed with surgery.  All risks, benefits and alternatives were discussed.  Risks include, but not exclusive to anesthetic complications, including death, MI, CVA, infection, bleeding DVT, fracture, residual pain and need for future surgery.    This discussion was held with the patient by Dionte Lopez MD and all questions were answered.    Plan arthroscopy of the right knee with debridement of medial meniscus.    Return for Post-operative eval.    Dionte Lopez MD

## 2022-09-25 ENCOUNTER — ANESTHESIA EVENT (OUTPATIENT)
Dept: PERIOP | Facility: HOSPITAL | Age: 47
End: 2022-09-25

## 2022-09-26 ENCOUNTER — ANESTHESIA (OUTPATIENT)
Dept: PERIOP | Facility: HOSPITAL | Age: 47
End: 2022-09-26

## 2022-09-26 ENCOUNTER — HOSPITAL ENCOUNTER (OUTPATIENT)
Facility: HOSPITAL | Age: 47
Setting detail: HOSPITAL OUTPATIENT SURGERY
Discharge: HOME | End: 2022-09-26
Attending: ORTHOPAEDIC SURGERY | Admitting: ORTHOPAEDIC SURGERY

## 2022-09-26 VITALS
TEMPERATURE: 98 F | HEIGHT: 63 IN | DIASTOLIC BLOOD PRESSURE: 64 MMHG | SYSTOLIC BLOOD PRESSURE: 110 MMHG | OXYGEN SATURATION: 99 % | WEIGHT: 184.3 LBS | HEART RATE: 85 BPM | BODY MASS INDEX: 32.66 KG/M2 | RESPIRATION RATE: 18 BRPM

## 2022-09-26 DIAGNOSIS — M25.561 CHRONIC PAIN OF RIGHT KNEE: ICD-10-CM

## 2022-09-26 DIAGNOSIS — G89.29 CHRONIC PAIN OF RIGHT KNEE: ICD-10-CM

## 2022-09-26 DIAGNOSIS — M23.91 INTERNAL DERANGEMENT OF RIGHT KNEE: ICD-10-CM

## 2022-09-26 DIAGNOSIS — E11.9 TYPE 2 DIABETES MELLITUS WITHOUT COMPLICATION, WITHOUT LONG-TERM CURRENT USE OF INSULIN: ICD-10-CM

## 2022-09-26 DIAGNOSIS — I10 HTN (HYPERTENSION), BENIGN: ICD-10-CM

## 2022-09-26 DIAGNOSIS — S83.241D ACUTE MEDIAL MENISCUS TEAR OF RIGHT KNEE, SUBSEQUENT ENCOUNTER: Primary | ICD-10-CM

## 2022-09-26 DIAGNOSIS — R13.10 DIFFICULTY SWALLOWING SOLIDS: ICD-10-CM

## 2022-09-26 LAB
B-HCG UR QL: NEGATIVE
GLUCOSE BLDC GLUCOMTR-MCNC: 82 MG/DL (ref 70–130)
GLUCOSE BLDC GLUCOMTR-MCNC: 96 MG/DL (ref 70–130)

## 2022-09-26 PROCEDURE — 25010000002 MORPHINE PER 10 MG: Performed by: ORTHOPAEDIC SURGERY

## 2022-09-26 PROCEDURE — 25010000002 MIDAZOLAM PER 1 MG: Performed by: ANESTHESIOLOGY

## 2022-09-26 PROCEDURE — 25010000002 PROPOFOL 10 MG/ML EMULSION: Performed by: ANESTHESIOLOGY

## 2022-09-26 PROCEDURE — 82962 GLUCOSE BLOOD TEST: CPT

## 2022-09-26 PROCEDURE — 25010000002 FENTANYL CITRATE (PF) 50 MCG/ML SOLUTION: Performed by: ANESTHESIOLOGY

## 2022-09-26 PROCEDURE — 25010000002 CEFAZOLIN PER 500 MG: Performed by: ORTHOPAEDIC SURGERY

## 2022-09-26 PROCEDURE — 29881 ARTHRS KNE SRG MNISECTMY M/L: CPT | Performed by: ORTHOPAEDIC SURGERY

## 2022-09-26 PROCEDURE — 81025 URINE PREGNANCY TEST: CPT | Performed by: ANESTHESIOLOGY

## 2022-09-26 PROCEDURE — 29881 ARTHRS KNE SRG MNISECTMY M/L: CPT | Performed by: SPECIALIST/TECHNOLOGIST, OTHER

## 2022-09-26 RX ORDER — SODIUM CHLORIDE 9 MG/ML
1000 INJECTION, SOLUTION INTRAVENOUS CONTINUOUS
Status: DISCONTINUED | OUTPATIENT
Start: 2022-09-26 | End: 2022-09-26 | Stop reason: HOSPADM

## 2022-09-26 RX ORDER — BUPIVACAINE HYDROCHLORIDE AND EPINEPHRINE 2.5; 5 MG/ML; UG/ML
INJECTION, SOLUTION EPIDURAL; INFILTRATION; INTRACAUDAL; PERINEURAL AS NEEDED
Status: DISCONTINUED | OUTPATIENT
Start: 2022-09-26 | End: 2022-09-26 | Stop reason: HOSPADM

## 2022-09-26 RX ORDER — ONDANSETRON 2 MG/ML
4 INJECTION INTRAMUSCULAR; INTRAVENOUS ONCE AS NEEDED
Status: DISCONTINUED | OUTPATIENT
Start: 2022-09-26 | End: 2022-09-26 | Stop reason: HOSPADM

## 2022-09-26 RX ORDER — MEPERIDINE HYDROCHLORIDE 25 MG/ML
12.5 INJECTION INTRAMUSCULAR; INTRAVENOUS; SUBCUTANEOUS
Status: DISCONTINUED | OUTPATIENT
Start: 2022-09-26 | End: 2022-09-26 | Stop reason: HOSPADM

## 2022-09-26 RX ORDER — PROPOFOL 10 MG/ML
VIAL (ML) INTRAVENOUS AS NEEDED
Status: DISCONTINUED | OUTPATIENT
Start: 2022-09-26 | End: 2022-09-26 | Stop reason: SURG

## 2022-09-26 RX ORDER — FLUMAZENIL 0.1 MG/ML
0.2 INJECTION INTRAVENOUS AS NEEDED
Status: DISCONTINUED | OUTPATIENT
Start: 2022-09-26 | End: 2022-09-26 | Stop reason: HOSPADM

## 2022-09-26 RX ORDER — PROMETHAZINE HYDROCHLORIDE 25 MG/1
25 TABLET ORAL ONCE AS NEEDED
Status: DISCONTINUED | OUTPATIENT
Start: 2022-09-26 | End: 2022-09-26 | Stop reason: HOSPADM

## 2022-09-26 RX ORDER — NALOXONE HCL 0.4 MG/ML
0.4 VIAL (ML) INJECTION AS NEEDED
Status: DISCONTINUED | OUTPATIENT
Start: 2022-09-26 | End: 2022-09-26 | Stop reason: HOSPADM

## 2022-09-26 RX ORDER — ACETAMINOPHEN 650 MG/1
650 SUPPOSITORY RECTAL ONCE AS NEEDED
Status: DISCONTINUED | OUTPATIENT
Start: 2022-09-26 | End: 2022-09-26 | Stop reason: HOSPADM

## 2022-09-26 RX ORDER — ACETAMINOPHEN 325 MG/1
650 TABLET ORAL ONCE AS NEEDED
Status: DISCONTINUED | OUTPATIENT
Start: 2022-09-26 | End: 2022-09-26 | Stop reason: HOSPADM

## 2022-09-26 RX ORDER — SCOLOPAMINE TRANSDERMAL SYSTEM 1 MG/1
1 PATCH, EXTENDED RELEASE TRANSDERMAL ONCE
Status: DISCONTINUED | OUTPATIENT
Start: 2022-09-26 | End: 2022-09-26 | Stop reason: HOSPADM

## 2022-09-26 RX ORDER — LIDOCAINE HYDROCHLORIDE 20 MG/ML
INJECTION, SOLUTION INFILTRATION; PERINEURAL AS NEEDED
Status: DISCONTINUED | OUTPATIENT
Start: 2022-09-26 | End: 2022-09-26 | Stop reason: SURG

## 2022-09-26 RX ORDER — HYDROCODONE BITARTRATE AND ACETAMINOPHEN 7.5; 325 MG/1; MG/1
1 TABLET ORAL EVERY 4 HOURS PRN
Qty: 20 TABLET | Refills: 0 | Status: SHIPPED | OUTPATIENT
Start: 2022-09-26 | End: 2022-10-06

## 2022-09-26 RX ORDER — MORPHINE SULFATE 10 MG/ML
INJECTION, SOLUTION INTRAMUSCULAR; INTRAVENOUS AS NEEDED
Status: DISCONTINUED | OUTPATIENT
Start: 2022-09-26 | End: 2022-09-26 | Stop reason: HOSPADM

## 2022-09-26 RX ORDER — PROMETHAZINE HYDROCHLORIDE 25 MG/1
25 SUPPOSITORY RECTAL ONCE AS NEEDED
Status: DISCONTINUED | OUTPATIENT
Start: 2022-09-26 | End: 2022-09-26 | Stop reason: HOSPADM

## 2022-09-26 RX ORDER — EPHEDRINE SULFATE 50 MG/ML
5 INJECTION, SOLUTION INTRAVENOUS ONCE AS NEEDED
Status: DISCONTINUED | OUTPATIENT
Start: 2022-09-26 | End: 2022-09-26 | Stop reason: HOSPADM

## 2022-09-26 RX ORDER — BUPIVACAINE HCL/0.9 % NACL/PF 0.1 %
2 PLASTIC BAG, INJECTION (ML) EPIDURAL ONCE
Status: COMPLETED | OUTPATIENT
Start: 2022-09-26 | End: 2022-09-26

## 2022-09-26 RX ORDER — DIPHENHYDRAMINE HYDROCHLORIDE 50 MG/ML
12.5 INJECTION INTRAMUSCULAR; INTRAVENOUS
Status: DISCONTINUED | OUTPATIENT
Start: 2022-09-26 | End: 2022-09-26 | Stop reason: HOSPADM

## 2022-09-26 RX ORDER — FENTANYL CITRATE 50 UG/ML
INJECTION, SOLUTION INTRAMUSCULAR; INTRAVENOUS AS NEEDED
Status: DISCONTINUED | OUTPATIENT
Start: 2022-09-26 | End: 2022-09-26 | Stop reason: SURG

## 2022-09-26 RX ORDER — MIDAZOLAM HYDROCHLORIDE 1 MG/ML
INJECTION INTRAMUSCULAR; INTRAVENOUS AS NEEDED
Status: DISCONTINUED | OUTPATIENT
Start: 2022-09-26 | End: 2022-09-26 | Stop reason: SURG

## 2022-09-26 RX ADMIN — FENTANYL CITRATE 100 MCG: 50 INJECTION INTRAMUSCULAR; INTRAVENOUS at 13:26

## 2022-09-26 RX ADMIN — SODIUM CHLORIDE 1000 ML: 9 INJECTION, SOLUTION INTRAVENOUS at 10:29

## 2022-09-26 RX ADMIN — PROPOFOL 100 MG: 10 INJECTION, EMULSION INTRAVENOUS at 13:24

## 2022-09-26 RX ADMIN — PROPOFOL 100 MG: 10 INJECTION, EMULSION INTRAVENOUS at 13:23

## 2022-09-26 RX ADMIN — FENTANYL CITRATE 100 MCG: 50 INJECTION INTRAMUSCULAR; INTRAVENOUS at 13:53

## 2022-09-26 RX ADMIN — LIDOCAINE HYDROCHLORIDE 100 MG: 20 INJECTION, SOLUTION INFILTRATION; PERINEURAL at 13:23

## 2022-09-26 RX ADMIN — MIDAZOLAM HYDROCHLORIDE 2 MG: 1 INJECTION, SOLUTION INTRAMUSCULAR; INTRAVENOUS at 13:10

## 2022-09-26 RX ADMIN — Medication 2 G: at 13:19

## 2022-09-26 RX ADMIN — SCOLOPAMINE TRANSDERMAL SYSTEM 1 PATCH: 1 PATCH, EXTENDED RELEASE TRANSDERMAL at 12:00

## 2022-09-26 NOTE — ANESTHESIA POSTPROCEDURE EVALUATION
Patient: Ellie Gross    Procedure Summary     Date: 09/26/22 Room / Location: [facility] OR [address] [place] [address] OR    Anesthesia Start: 1315 Anesthesia Stop: 1415    Procedure: RIGHT KNEE ARTHROSCOPY WITH DEBRIDEMENT MEDIAL MENISCUS (Right Knee) Diagnosis:       Chronic pain of right knee      Internal derangement of right knee      Acute medial meniscus tear of right knee, subsequent encounter      HTN (hypertension), benign      Type 2 diabetes mellitus without complication, without long-term current use of insulin (HCC)      (Chronic pain of right knee [M25.561, G89.29])      (Internal derangement of right knee [M23.91])      (Acute medial meniscus tear of right knee, subsequent encounter [S83.241D])      (HTN (hypertension), benign [I10])      (Type 2 diabetes mellitus without complication, without long-term current use of insulin (HCC) [E11.9])    Surgeons: Dionte Lopez MD Provider: Rashi Mera MD    Anesthesia Type: general ASA Status: 3          Anesthesia Type: general    Vitals  No vitals data found for the desired time range.          Post Anesthesia Care and Evaluation    Patient location during evaluation: PACU  Patient participation: complete - patient participated  Level of consciousness: awake and alert  Pain score: 0  Pain management: adequate    Airway patency: patent  Anesthetic complications: No anesthetic complications  PONV Status: none  Cardiovascular status: acceptable  Respiratory status: acceptable  Hydration status: acceptable

## 2022-09-26 NOTE — ANESTHESIA PROCEDURE NOTES
Airway  Date/Time: 9/26/2022 1:24 PM  Airway not difficult    General Information and Staff    Patient location during procedure: OR  Anesthesiologist: Rashi Mera MD  SRNA: Sadaf Toribio SRNA  Indications and Patient Condition  Indications for airway management: airway protection    Preoxygenated: yes  MILS maintained throughout  Mask difficulty assessment: 0 - not attempted    Final Airway Details  Final airway type: supraglottic airway      Successful airway: I-gel and LMA  Size 4    Number of attempts at approach: 1  Assessment: lips, teeth, and gum same as pre-op

## 2022-09-26 NOTE — ANESTHESIA PREPROCEDURE EVALUATION
Anesthesia Evaluation     Patient summary reviewed and Nursing notes reviewed   history of anesthetic complications: PONV  NPO Solid Status: > 8 hours  NPO Liquid Status: > 4 hours           Airway   Mallampati: I  TM distance: >3 FB  Neck ROM: full  No difficulty expected  Dental - normal exam     Pulmonary - negative pulmonary ROS and normal exam    breath sounds clear to auscultation  (-) COPD, asthma, sleep apnea, not a smoker    ROS comment: snores  Cardiovascular - normal exam  Exercise tolerance: good (4-7 METS)    ECG reviewed  Rhythm: regular  Rate: normal    (+) hypertension well controlled less than 2 medications, hyperlipidemia,   (-) valvular problems/murmurs, dysrhythmias, angina, cardiac stents, DVT    ROS comment:   Normal sinus rhythm  Low voltage QRS  Borderline ECG  When compared with ECG of 28-JAN-2022 06:58,  Borderline criteria for Inferior infarct are no longer Present    Neuro/Psych  (+) psychiatric history Anxiety and Depression,    (-) seizures, TIA, CVA, headaches, weakness, numbness  GI/Hepatic/Renal/Endo    (+) obesity,  GERD well controlled,  diabetes mellitus (glu 96) type 2 well controlled,   (-) hepatitis, liver disease, no renal disease, no thyroid disorder    Musculoskeletal     (+) arthralgias,       ROS comment: AP bilateral standing of the knees with lateral of the right   knee show acceptable position and alignment of both knees with no evidence of acute bony abnormality.  Mild varus positioning is noted in both knees.    She has mild to moderate medial joint space narrowing in the right knee.   Approximately 45 to 50% medial joint space narrowing.  No significant arthritic change noted in the patellofemoral compartment and the articular   surfaces remain smooth on the lateral x-ray as well.  No acute findings.   Mild arthritic changes noted in the left knee with varus positioning and a small marginal osteophyte along the medial joint space.   Abdominal   (+) obese,     Substance History - negative use  (-) alcohol use, drug use     OB/GYN negative ob/gyn ROS   (-)  Pregnant        Other   arthritis (knees),      (-) history of cancer  ROS/Med Hx Other: Right knee meniscus tear                Anesthesia Plan    ASA 3     general     (Scop patch ordered)  intravenous induction     Anesthetic plan, risks, benefits, and alternatives have been provided, discussed and informed consent has been obtained with: patient and spouse/significant other.        CODE STATUS:

## 2022-09-28 ENCOUNTER — HOSPITAL ENCOUNTER (OUTPATIENT)
Dept: PHYSICAL THERAPY | Facility: HOSPITAL | Age: 47
Setting detail: THERAPIES SERIES
Discharge: HOME | End: 2022-09-28

## 2022-09-28 DIAGNOSIS — M23.91 INTERNAL DERANGEMENT OF RIGHT KNEE: Primary | ICD-10-CM

## 2022-09-28 DIAGNOSIS — S83.241A ACUTE MEDIAL MENISCUS TEAR OF RIGHT KNEE, INITIAL ENCOUNTER: ICD-10-CM

## 2022-09-28 DIAGNOSIS — G89.29 CHRONIC PAIN OF RIGHT KNEE: ICD-10-CM

## 2022-09-28 DIAGNOSIS — M25.561 CHRONIC PAIN OF RIGHT KNEE: ICD-10-CM

## 2022-09-28 PROCEDURE — G0283 ELEC STIM OTHER THAN WOUND: HCPCS | Performed by: PHYSICAL THERAPIST

## 2022-09-28 PROCEDURE — 97162 PT EVAL MOD COMPLEX 30 MIN: CPT | Performed by: PHYSICAL THERAPIST

## 2022-09-28 NOTE — THERAPY EVALUATION
Outpatient Physical Therapy Ortho Initial Evaluation  [facility]     Patient Name: Ellie Gross  : 1975  MRN: [id number]  Today's Date: 2022      Visit Date: 2022  Visit   Return to MD: NATACHA  Re-certification date: 10/18/22  Patient Active Problem List   Diagnosis   • Difficulty swallowing solids   • Dyspepsia   • HTN (hypertension), benign   • Morbid obesity ([facility])   • Obesity, Class II, BMI 35-39.9   • Diabetes mellitus type 2 in obese ([facility])   • Hyperlipidemia   • Chest pain   • Chronic pain of right knee   • Internal derangement of right knee   • Acute medial meniscus tear of right knee   • Type 2 diabetes mellitus without complication, without long-term current use of insulin ([facility])        Past Medical History:   Diagnosis Date   • Acid reflux     on Nexium; still moderate despite PPI   • Anxiety    • Depression    • Dysphagia    • Elevated cholesterol     on medication   • Foot pain     ball of foot    • Heartburn    • Hemorrhoids    • Hypertension     on meds; runs normal at home   • IBS (irritable bowel syndrome)    • Joint pain    • Knee pain    • PONV (postoperative nausea and vomiting)    • Snoring    • SOB (shortness of breath) on exertion     deconditioned   • Type 2 diabetes mellitus ([facility])     A1C was 6.8%   • Wears glasses         Past Surgical History:   Procedure Laterality Date   • APPENDECTOMY N/A 2022    Procedure: APPENDECTOMY LAPAROSCOPIC;  Surgeon: Cely Smyth MD;  Location: [facility] OR;  Service: General;  Laterality: N/A;   • BLADDER SURGERY      TVT   • CHOLECYSTECTOMY      Laparoscopic     • ENDOSCOPY  2014    Distal esophageal ring dilated 50 Italian   • ENDOSCOPY N/A 2016    Procedure: ESOPHAGOGASTRODUODENOSCOPY WITH ANESTHESIA;  Surgeon: Maged Rios MD;  Location: [facility] ENDOSCOPY;  Service:    • KNEE ARTHROSCOPY W/ DEBRIDEMENT Right        Visit Dx:     ICD-10-CM ICD-9-CM   1. Internal derangement of right knee  M23.91 717.9    2. Acute medial meniscus tear of right knee, initial encounter  S83.241A 836.0   3. Chronic pain of right knee  M25.561 719.46    G89.29 338.29     Medications (Admitted on 9/28/2022)         ALPRAZolam (XANAX) 0.25 MG tablet    BuPROPion HCl (WELLBUTRIN PO)    Cholecalciferol (VITAMIN D3 PO)    esomeprazole (nexIUM) 40 MG capsule    estradiol (ESTRACE) 1 MG tablet    fenofibrate (TRICOR) 145 MG tablet    FLUoxetine (PROzac) 20 MG capsule    glipizide (GLUCOTROL XL) 10 MG 24 hr tablet    HYDROcodone-acetaminophen (NORCO) 7.5-325 MG per tablet    losartan-hydrochlorothiazide (HYZAAR) 100-25 MG per tablet    medroxyPROGESTERone (PROVERA) 2.5 MG tablet    pioglitazone (ACTOS) 30 MG tablet    rosuvastatin (CRESTOR) 10 MG tablet    TRULICITY 1.5 MG/0.5ML solution pen-injector    Allergies: NKA     Patient History     Row Name 09/27/22 1549             History    Chief Complaint Difficulty Walking;Difficulty with daily activities;Joint swelling;Pain;Swelling (P)    -patient      Type of Pain Knee pain (P)    -patient      Date Current Problem(s) Began 08/20/21 (P)    -patient      Brief Description of Current Complaint Post surgery pt (P)    -patient      Patient/Caregiver Goals Relieve pain;Return to prior level of function;Improve mobility;Improve strength (P)    -patient      Hand Dominance right-handed (P)    -patient      Occupation/sports/leisure activities Exercising, running, hiking (P)    -patient      Patient seeing anyone else for problem(s)? No (P)    -patient      What clinical tests have you had for this problem? X-ray;MRI;Other 2 (comment) (P)    -patient      Other Clinical Tests Surgery (P)    -patient      Are you or can you be pregnant No (P)    -patient              Fall Risk Assessment    Any falls in the past year: No (P)    -patient              Services    Prior Rehab/Home Health Experiences Yes (P)    -patient      Are you currently receiving Home Health services No (P)    -patient      Do you  plan to receive Home Health services in the near future No (P)    -patient              Daily Activities    Primary Language English (P)    -patient      Are you able to read Yes (P)    -patient      Are you able to write Yes (P)    -patient      How does patient learn best? Reading (P)    -patient              Safety    Are you being hurt, hit, or frightened by anyone at home or in your life? No (P)    -patient      Are you being neglected by a caregiver No (P)    -patient      Have you had any of the following issues with Depression;Anxiety;Panic Attacks (P)    -patient            User Key  (r) = Recorded By, (t) = Taken By, (c) = Cosigned By    Initials Name Provider Type    patient Ellie Gross --                 PT Ortho     Row Name 09/28/22 1102       Subjective Comments    Subjective Comments 46 yo female with chronic R knee pain s/p arthroscopy with debridement of medial meniscus on 9/26/22. Same day surgery. Aggravating factors: climibing stairs: down>up, weight bearing on R LE solely. Easing factors: ice, tylenol. Occupation: unemployed. , lives with  in 2 level home with 15 steps with single handrail to 2nd level bedroom.  -BS       Precautions and Contraindications    Precautions/Limitations no known precautions/limitations  -BS       Subjective Pain    Able to rate subjective pain? yes  -BS    Pre-Treatment Pain Level 3  -BS    Subjective Pain Comment medial R knee pain  -BS       Posture/Observations    Posture/Observations Comments mild R knee edema, surgical stitches intact with R medial/lateral knee joint line. 5 consecutive R SLR 's with no extension lag noted.  -BS       General ROM    GENERAL ROM COMMENTS AROM: L knee 0-147° R knee 0-3-135°  -BS       MMT (Manual Muscle Testing)    General MMT Comments MMT: R LE-hip flex 4/5 hip abd 4/5 knee ext 4-/5 knee ext 4-/5 knee flex 5/5 ankle DF 5/5; LLE 5/5 except 4/5 L knee flex  -BS       Sensation    Light Touch No apparent  deficits  -BS       Flexibility    Flexibility Tested? Lower Extremity  -BS       Lower Extremity Flexibility    Hamstrings Right:;Mildly limited;Left:;WNL  -BS       Balance Skills Training    SLS R 9 sec L 16 sec  -BS          User Key  (r) = Recorded By, (t) = Taken By, (c) = Cosigned By    Initials Name Provider Type    BS Juanito Floyd, PT Physical Therapist                                   PT OP Goals     Row Name 09/28/22 1102          PT Short Term Goals    STG Date to Achieve 10/12/22  -BS     STG 1 Patient independent with HEP  -BS     STG 1 Progress New  -BS     STG 2 Improve R knee AROM to 0-0-145°  -BS     STG 2 Progress New  -BS     STG 3 Improve R knee ext MMT to 5/5  -BS     STG 3 Progress New  -BS     STG 4 Improve R hip adduction MMT to 5/5  -BS     STG 4 Progress New  -BS     STG 5 Improve R SLS to >/=30 sec consistently  -BS     STG 5 Progress New  -BS     STG 6 Able to ascend/descend 15 steps with step over step pattern mod I with use of handrail with non-antalgic gait pattern  -BS     STG 6 Progress New  -BS            Time Calculation    PT Goal Re-Cert Due Date 10/19/22  -BS           User Key  (r) = Recorded By, (t) = Taken By, (c) = Cosigned By    Initials Name Provider Type    Juanito Strong, PT Physical Therapist                 PT Assessment/Plan     Row Name 09/28/22 1102          PT Assessment    Functional Limitations Impaired gait;Performance in work activities;Performance in sport activities;Performance in self-care ADL;Performance in leisure activities;Limitation in home management  -BS     Impairments Balance;Edema;Endurance;Impaired aerobic capacity;Impaired flexibility;Muscle strength;Pain;Range of motion  -BS     Assessment Comments 48 yo female with R knee pain s/p arthroscopy with debridement to the medial meniscus on 9/26/22. Presents with R knee pain, R knee edema, slight R knee ROM loss, quad weakness and impaired standing balance on R LE>L LE.  -BS     Please refer  to paper survey for additional self-reported information No  -BS     Rehab Potential Good  -BS     Patient/caregiver participated in establishment of treatment plan and goals Yes  -BS     Patient would benefit from skilled therapy intervention Yes  -BS            PT Plan    PT Frequency 1x/week;2x/week  -BS     Predicted Duration of Therapy Intervention (PT) 2-3 weeks  -BS     Planned CPT's? PT RE-EVAL: 77767;PT THER PROC EA 15 MIN: 32719;PT MANUAL THERAPY EA 15 MIN: 53848;PT NEUROMUSC RE-EDUCATION EA 15 MIN: 39075;PT ELECTRICAL STIM UNATTEND: ;PT HOT/COLD PACK WC NONMCARE: 70755;PT THER SUPP EA 15 MIN;PT EVAL MOD COMPLELITY: 35071  -BS     Physical Therapy Interventions (Optional Details) balance training;home exercise program;joint mobilization;manual therapy techniques;modalities;neuromuscular re-education;patient/family education;ROM (Range of Motion);stair training;strengthening;stretching  -BS     PT Plan Comments Address quad and hip adductor strengthening, standing balance training and stair training. Modalities prn for pain/edema management.  -BS           User Key  (r) = Recorded By, (t) = Taken By, (c) = Cosigned By    Initials Name Provider Type    Juanito Strong, PT Physical Therapist                 Modalities     Row Name 09/28/22 1102             Ice    Ice Applied Yes  -BS      Location R knee  15 min  -BS      Ice S/P Rx Yes  -BS              ELECTRICAL STIMULATION    Attended/Unattended Unattended  -BS      Stimulation Type IFC  -BS      Location/Electrode Placement/Other R knee  -BS      PT E-Stim Unattended Minutes 15  -BS            User Key  (r) = Recorded By, (t) = Taken By, (c) = Cosigned By    Initials Name Provider Type    Juanito Strong, PT Physical Therapist               OP Exercises     Row Name 09/28/22 1102             Precautions    Existing Precautions/Restrictions no known precautions/restrictions  -BS              Subjective Comments    Subjective Comments 48 yo female  "with chronic R knee pain s/p arthroscopy with debridement of medial meniscus on 9/26/22. Same day surgery. Aggravating factors: climibing stairs: down>up, weight bearing on R LE solely. Easing factors: ice, tylenol. Occupation: unemployed. , lives with  in 2 level home with 15 steps with single handrail to 2nd level bedroom.  -BS              Subjective Pain    Able to rate subjective pain? yes  -BS      Pre-Treatment Pain Level 3  -BS      Post-Treatment Pain Level 3  -BS      Subjective Pain Comment medial R knee pain  -BS              Exercise 1    Exercise Name 1 SLR x 3  -BS      Sets 1 2x5 w/ flex  -BS      Reps 1 1x10 w/ abd/add  -BS      Additional Comments R LE  -BS              Exercise 2    Exercise Name 2 SLS, R  -BS      Sets 2 9 sec on R  -BS      Reps 2 22 sec on L  -BS              Exercise 3    Exercise Name 3 see modalities  -BS              Exercise 4    Exercise Name 4 lateral step ups, 6\"  -BS      Sets 4 1  -BS      Reps 4 10  -BS            User Key  (r) = Recorded By, (t) = Taken By, (c) = Cosigned By    Initials Name Provider Type    BS Juanito Floyd, PT Physical Therapist                                        Time Calculation:     Start Time: 1102  Stop Time: 1155  Time Calculation (min): 53 min  PT Non-Billable Time (min): 15 min  Total Timed Code Minutes- PT: 38 minute(s)  Untimed Charges  PT E-Stim Unattended Minutes: 15  Total Minutes  Untimed Charges Total Minutes: 15   Total Minutes: 15     Therapy Charges for Today     Code Description Service Date Service Provider Modifiers Qty    31369186626 HC PT EVAL MOD COMPLEXITY 3 9/28/2022 Juanito Floyd, PT GP 1    08601902325 HC PT ELECTRICAL STIM UNATTENDED 9/28/2022 Juanito Floyd, PT  1                    Juanito Floyd, PT  9/28/2022      "

## 2022-10-04 ENCOUNTER — HOSPITAL ENCOUNTER (OUTPATIENT)
Dept: PHYSICAL THERAPY | Facility: HOSPITAL | Age: 47
Setting detail: THERAPIES SERIES
Discharge: HOME | End: 2022-10-04

## 2022-10-04 DIAGNOSIS — M25.561 CHRONIC PAIN OF RIGHT KNEE: ICD-10-CM

## 2022-10-04 DIAGNOSIS — G89.29 CHRONIC PAIN OF RIGHT KNEE: ICD-10-CM

## 2022-10-04 DIAGNOSIS — S83.241A ACUTE MEDIAL MENISCUS TEAR OF RIGHT KNEE, INITIAL ENCOUNTER: ICD-10-CM

## 2022-10-04 DIAGNOSIS — M23.91 INTERNAL DERANGEMENT OF RIGHT KNEE: Primary | ICD-10-CM

## 2022-10-04 PROCEDURE — 97110 THERAPEUTIC EXERCISES: CPT

## 2022-10-04 NOTE — THERAPY TREATMENT NOTE
Outpatient Physical Therapy Ortho Treatment Note  [facility]     Patient Name: Ellie Gross  : 1975  MRN: [id number]  Today's Date: 10/4/2022      Visit Date: 10/04/2022     Subjective Improvement better  Visits 2/2  Visits approved ?  RTMD 10-  Recert Date 10-    S/P R knee scope 2022    Visit Dx:    ICD-10-CM ICD-9-CM   1. Internal derangement of right knee  M23.91 717.9   2. Acute medial meniscus tear of right knee, initial encounter  S83.241A 836.0   3. Chronic pain of right knee  M25.561 719.46    G89.29 338.29       Patient Active Problem List   Diagnosis   • Difficulty swallowing solids   • Dyspepsia   • HTN (hypertension), benign   • Morbid obesity ([facility])   • Obesity, Class II, BMI 35-39.9   • Diabetes mellitus type 2 in obese ([facility])   • Hyperlipidemia   • Chest pain   • Chronic pain of right knee   • Internal derangement of right knee   • Acute medial meniscus tear of right knee   • Type 2 diabetes mellitus without complication, without long-term current use of insulin ([facility])        Past Medical History:   Diagnosis Date   • Acid reflux     on Nexium; still moderate despite PPI   • Anxiety    • Depression    • Dysphagia    • Elevated cholesterol     on medication   • Foot pain     ball of foot    • Heartburn    • Hemorrhoids    • Hypertension     on meds; runs normal at home   • IBS (irritable bowel syndrome)    • Joint pain    • Knee pain    • PONV (postoperative nausea and vomiting)    • Snoring    • SOB (shortness of breath) on exertion     deconditioned   • Type 2 diabetes mellitus ([facility])     A1C was 6.8%   • Wears glasses         Past Surgical History:   Procedure Laterality Date   • APPENDECTOMY N/A 2022    Procedure: APPENDECTOMY LAPAROSCOPIC;  Surgeon: Cely Smyth MD;  Location: [facility] OR;  Service: General;  Laterality: N/A;   • BLADDER SURGERY      TVT   • CHOLECYSTECTOMY      Laparoscopic     • ENDOSCOPY  2014    Distal esophageal ring  dilated 50 Macedonian   • ENDOSCOPY N/A 12/30/2016    Procedure: ESOPHAGOGASTRODUODENOSCOPY WITH ANESTHESIA;  Surgeon: Maged Rios MD;  Location:  [facility] ENDOSCOPY;  Service:    • KNEE ARTHROSCOPY Right 9/26/2022    Procedure: RIGHT KNEE ARTHROSCOPY WITH DEBRIDEMENT MEDIAL MENISCUS;  Surgeon: Dionte Lopez MD;  Location: [facility] OR;  Service: Orthopedics;  Laterality: Right;   • KNEE ARTHROSCOPY W/ DEBRIDEMENT Right         PT Ortho     Row Name 10/04/22 1100       Subjective Comments    Subjective Comments Patient states that she is not having any pain.  She was able to go to the store.  She was told to let pain be her guide  -CP       Precautions and Contraindications    Precautions S/P R knee scope 9-  -CP       Subjective Pain    Able to rate subjective pain? yes  -CP    Pre-Treatment Pain Level 0  -CP       General ROM    RT Lower Ext Rt Knee Extension/Flexion  -CP       Right Lower Ext    Rt Knee Extension/Flexion AROM 0-135  -CP          User Key  (r) = Recorded By, (t) = Taken By, (c) = Cosigned By    Initials Name Provider Type    Tami Hernandez PTA Physical Therapist Assistant                             PT Assessment/Plan     Row Name 10/04/22 1152          PT Assessment    Assessment Comments Patient presents with no pain in right knee.  She tolerated RX very well and does appear to be compliant with current HEP  -CP            PT Plan    PT Frequency 1x/week;2x/week  -CP     Predicted Duration of Therapy Intervention (PT) 2-3 weeks  -CP     PT Plan Comments Cont with POC.  hip strengthening  -CP           User Key  (r) = Recorded By, (t) = Taken By, (c) = Cosigned By    Initials Name Provider Type    Tami Hernandez PTA Physical Therapist Assistant                 Modalities     Row Name 10/04/22 1100             Subjective Pain    Post-Treatment Pain Level 0  -CP              Ice    Ice Applied Yes  -CP      Location right knee  -CP      PT Ice Rx Minutes 10  -CP      Ice  "S/P Rx Yes  -CP            User Key  (r) = Recorded By, (t) = Taken By, (c) = Cosigned By    Initials Name Provider Type    Tami Hernandez, PTA Physical Therapist Assistant               OP Exercises     Row Name 10/04/22 1153 10/04/22 1100          Subjective Comments    Subjective Comments -- Patient states that she is not having any pain.  She was able to go to the store.  She was told to let pain be her guide  -CP            Subjective Pain    Able to rate subjective pain? -- yes  -CP     Pre-Treatment Pain Level -- 0  -CP     Post-Treatment Pain Level -- 0  -CP            Total Minutes    48281 - PT Therapeutic Exercise Minutes 40  -CP --            Exercise 1    Exercise Name 1 -- Pro II level 1  -CP     Time 1 -- 10  -CP            Exercise 2    Exercise Name 2 -- incline stretch  -CP     Cueing 2 -- Verbal;Demo  -CP     Sets 2 -- 3  -CP     Time 2 -- 30  -CP            Exercise 3    Exercise Name 3 -- standing HS stretch  -CP     Cueing 3 -- Verbal;Demo  -CP     Sets 3 -- 3  -CP     Time 3 -- 30\"  -CP            Exercise 4    Exercise Name 4 -- step up 4\"  -CP     Cueing 4 -- Verbal;Demo  -CP     Sets 4 -- 2  -CP     Reps 4 -- 10  -CP            Exercise 5    Exercise Name 5 -- CR/TR  -CP     Cueing 5 -- Verbal;Demo  -CP     Sets 5 -- 2  -CP     Reps 5 -- 10  -CP            Exercise 6    Exercise Name 6 -- saq  -CP     Cueing 6 -- Verbal;Tactile  -CP     Sets 6 -- 2  -CP     Reps 6 -- 10  -CP     Time 6 -- 5\" holds  -CP            Exercise 7    Exercise Name 7 -- Standing standing hip AB  -CP     Cueing 7 -- Verbal;Demo  -CP     Sets 7 -- 1  -CP     Reps 7 -- 10  -CP     Time 7 -- B LE  -CP            Exercise 8    Exercise Name 8 -- heelslides with strap  -CP     Cueing 8 -- Verbal  -CP     Reps 8 -- 20  -CP            Exercise 9    Exercise Name 9 -- QS  -CP     Cueing 9 -- Verbal;Tactile  -CP     Sets 9 -- 2  -CP     Reps 9 -- 10  -CP     Time 9 -- 5\" holds  -CP            Exercise 10    Exercise " "Name 10 -- SAQ  -CP     Cueing 10 -- Verbal;Tactile  -CP     Sets 10 -- 2  -CP     Reps 10 -- 10  -CP     Time 10 -- 5\"  -CP           User Key  (r) = Recorded By, (t) = Taken By, (c) = Cosigned By    Initials Name Provider Type    CP Tami Saldana PTA Physical Therapist Assistant                              PT OP Goals     Row Name 10/04/22 1100          PT Short Term Goals    STG Date to Achieve 10/12/22  -CP     STG 1 Patient independent with HEP  -CP     STG 1 Progress New  -CP     STG 2 Improve R knee AROM to 0-0-145°  -CP     STG 2 Progress New  -CP     STG 3 Improve R knee ext MMT to 5/5  -CP     STG 3 Progress New  -CP     STG 4 Improve R hip adduction MMT to 5/5  -CP     STG 4 Progress New  -CP     STG 5 Improve R SLS to >/=30 sec consistently  -CP     STG 5 Progress New  -CP     STG 6 Able to ascend/descend 15 steps with step over step pattern mod I with use of handrail with non-antalgic gait pattern  -CP     STG 6 Progress New  -CP            Time Calculation    PT Goal Re-Cert Due Date 10/19/22  -CP           User Key  (r) = Recorded By, (t) = Taken By, (c) = Cosigned By    Initials Name Provider Type    CP Tami Saldana PTA Physical Therapist Assistant                Therapy Education  Education Details: standing HS stretch, CR/TR, hip AB, heelslides with strap, QS, SAQ  Given: HEP  Program: New  How Provided: Verbal, Demonstration, Written  Provided to: Patient  Level of Understanding: Teach back education performed, Verbalized, Demonstrated              Time Calculation:   Start Time: 1102  Stop Time: 1153  Time Calculation (min): 51 min  Total Timed Code Minutes- PT: 40 minute(s)  Timed Charges  37026 - PT Therapeutic Exercise Minutes: 40  Untimed Charges  PT Ice Rx Minutes: 10  Total Minutes  Timed Charges Total Minutes: 40  Untimed Charges Total Minutes: 10   Total Minutes: 40  Therapy Charges for Today     Code Description Service Date Service Provider Modifiers Qty    71775067189 HC PT " THER SUPP EA 15 MIN 10/4/2022 Tami Saldana, BRITTNEE GP 1    55405444506 HC PT THER PROC EA 15 MIN 10/4/2022 Tami Saldana PTA GP, CQ 3                    Tami Saldana PTA  10/4/2022

## 2022-10-06 ENCOUNTER — OFFICE VISIT (OUTPATIENT)
Dept: ORTHOPEDIC SURGERY | Facility: CLINIC | Age: 47
End: 2022-10-06

## 2022-10-06 VITALS — HEIGHT: 63 IN | WEIGHT: 184 LBS | BODY MASS INDEX: 32.6 KG/M2

## 2022-10-06 DIAGNOSIS — Z98.890 STATUS POST ARTHROSCOPIC SURGERY OF RIGHT KNEE: Primary | ICD-10-CM

## 2022-10-06 PROCEDURE — 99024 POSTOP FOLLOW-UP VISIT: CPT | Performed by: NURSE PRACTITIONER

## 2022-10-06 NOTE — PROGRESS NOTES
"Knee Scope follow Up 1st Visit      Patient: Ellie Gross    YOB: 1975      Chief Complaint   Patient presents with   • Right Knee - Post-op   • Suture / Staple Removal     Date of surgery:      09/26/22 (10d) Dionte Lopez MD   Right Knee Arthroscopy With Debridement Medial Meniscus - Right         History of Present Illness: This 47-year-old female patient presents today for a 10-day follow-up status post right knee arthroscopic procedure with debridement of medial meniscus.  This patient has no unusual complaints and reports she is progressing well.  The patient states \"I feel great.\".  Denies numbness and tingling.  Denies fever and chills.  The patient reports she is participating physical therapy and has 3 sessions left.        Allergies: No Known Allergies    Medications:   Current Outpatient Medications   Medication Sig Dispense Refill   • ALPRAZolam (XANAX) 0.25 MG tablet Take 0.25 mg by mouth Daily As Needed.     • BuPROPion HCl (WELLBUTRIN PO) Take 300 mg by mouth Daily.     • Cholecalciferol (VITAMIN D3 PO) Take 25 mcg by mouth Daily.     • esomeprazole (nexIUM) 40 MG capsule Take 40 mg by mouth Every Morning Before Breakfast.     • estradiol (ESTRACE) 1 MG tablet TAKE 1 TABLET BY MOUTH DAILY. 30 tablet 0   • fenofibrate (TRICOR) 145 MG tablet Take 145 mg by mouth Daily.     • FLUoxetine (PROzac) 20 MG capsule Take 20 mg by mouth Daily.     • glipizide (GLUCOTROL XL) 10 MG 24 hr tablet Take 10 mg by mouth Daily.     • losartan-hydrochlorothiazide (HYZAAR) 100-25 MG per tablet Take 1 tablet by mouth Daily.     • medroxyPROGESTERone (PROVERA) 2.5 MG tablet TAKE 1 TABLET BY MOUTH DAILY. 30 tablet 0   • pioglitazone (ACTOS) 30 MG tablet Take 30 mg by mouth Daily.     • rosuvastatin (CRESTOR) 10 MG tablet Take 10 mg by mouth Daily.     • TRULICITY 1.5 MG/0.5ML solution pen-injector Inject 1.5 mg as directed 1 (One) Time Per Week. Takes on Sundays       No current " "facility-administered medications for this visit.           Physical Exam: 47 y.o. female  General Appearance:    Alert, cooperative, in no acute distress                   Vitals:    10/06/22 0851   Weight: 83.5 kg (184 lb)   Height: 160 cm (63\")   PainSc: 0-No pain        Patient is alert and oriented ×3, no acute distress, normal mood and affect.  The right knee physical exam shows arthroscopic incisions healing as expected.    No erythema, streaking drainage or increased warmth noted.  Calf is soft and non-tender.  No sign or sx of DVT.  Good range of motion.  No pain with arc of motion.  Sensation intact.  Neurovascular intact.    Diagnoses and all orders for this visit:    Status post arthroscopic surgery of right knee      S/P knee scope.  I did review intraoperative findings with the patient.      Plan: Sutures removed and Steri-Strips applied.  Steri-Strip education and incisional care education provided.  Recommended patient continue physical therapy and follow-up in 4 weeks.  Discussed with patient to establish a good home exercise program and limitations in therapy.  The patient was advised if she has any limping, she should use a cane or crutch.  The patient is not using a cane today.  Discussed with the patient of the findings of the scope.  The patient had some arthritic changes to the medial femoral condyle as well as the medial tibial plateau.  Recommended RICE therapy.  Weight-bear as tolerated.  Advance as tolerated based on pain.  Recommended to follow-up in 4 weeks or sooner as needed if symptoms change or worsen.    All questions and concerns are addressed with understanding noted. They are aware and are in agreement to this plan.    10/06/22 at 08:49 CDT by MALI Phillips     This document has been electronically signed by MALI Phillips on October 6, 2022 09:48 CDT      EMR Dragon/Transciption Disclaimer: Some of this note may be an electronic transcription/translation of " spoken language to printed text using the Dragon Dictation System.

## 2022-10-07 ENCOUNTER — HOSPITAL ENCOUNTER (OUTPATIENT)
Dept: PHYSICAL THERAPY | Facility: HOSPITAL | Age: 47
Setting detail: THERAPIES SERIES
Discharge: HOME | End: 2022-10-07

## 2022-10-07 DIAGNOSIS — M23.91 INTERNAL DERANGEMENT OF RIGHT KNEE: Primary | ICD-10-CM

## 2022-10-07 DIAGNOSIS — S83.241A ACUTE MEDIAL MENISCUS TEAR OF RIGHT KNEE, INITIAL ENCOUNTER: ICD-10-CM

## 2022-10-07 PROCEDURE — 97140 MANUAL THERAPY 1/> REGIONS: CPT | Performed by: PHYSICAL THERAPIST

## 2022-10-07 PROCEDURE — 97110 THERAPEUTIC EXERCISES: CPT | Performed by: PHYSICAL THERAPIST

## 2022-10-07 NOTE — THERAPY TREATMENT NOTE
Outpatient Physical Therapy Ortho Treatment Note  [facility]     Patient Name: Ellie Gross  : 1975  MRN: [id number]  Today's Date: 10/7/2022      Visit Date: 10/07/2022  Subjective Improvement better  Visits 3/3  Visits approved ?  RTMD 10-  Recert Date 10-     S/P R knee scope 2022    Visit Dx:    ICD-10-CM ICD-9-CM   1. Internal derangement of right knee  M23.91 717.9   2. Acute medial meniscus tear of right knee, initial encounter  S83.241A 836.0       Patient Active Problem List   Diagnosis   • Difficulty swallowing solids   • Dyspepsia   • HTN (hypertension), benign   • Morbid obesity ([facility])   • Obesity, Class II, BMI 35-39.9   • Diabetes mellitus type 2 in obese ([facility])   • Hyperlipidemia   • Chest pain   • Chronic pain of right knee   • Internal derangement of right knee   • Acute medial meniscus tear of right knee   • Type 2 diabetes mellitus without complication, without long-term current use of insulin ([facility])   • Status post arthroscopic surgery of right knee        Past Medical History:   Diagnosis Date   • Acid reflux     on Nexium; still moderate despite PPI   • Anxiety    • Depression    • Dysphagia    • Elevated cholesterol     on medication   • Foot pain     ball of foot    • Heartburn    • Hemorrhoids    • Hypertension     on meds; runs normal at home   • IBS (irritable bowel syndrome)    • Joint pain    • Knee pain    • PONV (postoperative nausea and vomiting)    • Snoring    • SOB (shortness of breath) on exertion     deconditioned   • Type 2 diabetes mellitus ([facility])     A1C was 6.8%   • Wears glasses         Past Surgical History:   Procedure Laterality Date   • APPENDECTOMY N/A 2022    Procedure: APPENDECTOMY LAPAROSCOPIC;  Surgeon: Cely Smyth MD;  Location: [facility];  Service: General;  Laterality: N/A;   • BLADDER SURGERY      TVT   • CHOLECYSTECTOMY      Laparoscopic     • ENDOSCOPY  2014    Distal esophageal ring dilated 50 [place]    • ENDOSCOPY N/A 12/30/2016    Procedure: ESOPHAGOGASTRODUODENOSCOPY WITH ANESTHESIA;  Surgeon: Maged Rios MD;  Location:  [facility];  Service:    • KNEE ARTHROSCOPY Right 9/26/2022    Procedure: RIGHT KNEE ARTHROSCOPY WITH DEBRIDEMENT MEDIAL MENISCUS;  Surgeon: Dionte Lopez MD;  Location: [facility] OR;  Service: Orthopedics;  Laterality: Right;   • KNEE ARTHROSCOPY W/ DEBRIDEMENT Right         PT Ortho     Row Name 10/07/22 1025       Precautions and Contraindications    Precautions S/P R knee scope 9-  -BS       Subjective Pain    Able to rate subjective pain? yes  -BS    Pre-Treatment Pain Level 0  -BS    Post-Treatment Pain Level 0  -BS       Right Lower Ext    RT Lower Extremity Comments AROM: R ankle DF 0° (7° post MFR/TrP to R gastroc); L ankle DF 7°  -BS       MMT (Manual Muscle Testing)    General MMT Comments R ankle DF 4+/5 R knee flex 4+/5 ext 4+/5  -BS          User Key  (r) = Recorded By, (t) = Taken By, (c) = Cosigned By    Initials Name Provider Type    Juanito Strong, PT Physical Therapist                             PT Assessment/Plan     Row Name 10/07/22 1025          PT Assessment    Assessment Comments Improved R ankle DF AROM post aggressive manual therapy to the gastrocnemius. Limited eccentric control of R pretibials noted with heel walking (R ankle DF MMT 4+/5). Improved R quad strength noted (4+/5 MMT) today.  -BS            PT Plan    PT Frequency 1x/week;2x/week  -BS     Predicted Duration of Therapy Intervention (PT) 2-3 weeks  -BS     PT Plan Comments continue R quad strengthening, recheck tolerance to repeated standing toe raises on R knee symptoms.  -BS           User Key  (r) = Recorded By, (t) = Taken By, (c) = Cosigned By    Initials Name Provider Type    Juanito Strong, PT Physical Therapist                   OP Exercises     Row Name 10/07/22 1025             Subjective Comments    Subjective Comments Reports was in a bit more discomfort of R  "knee with inclusion of standing toe raises. No pain this morning.  -BS              Subjective Pain    Able to rate subjective pain? yes  -BS      Pre-Treatment Pain Level 0  -BS      Post-Treatment Pain Level 0  -BS              Exercise 1    Exercise Name 1 Pro II level 4  -BS      Time 1 10  -BS              Exercise 2    Exercise Name 2 see manual  -BS              Exercise 3    Exercise Name 3 seated resisted B ankle DF manually with black TB  -BS      Sets 3 1  -BS      Reps 3 20 ea  -BS              Exercise 4    Exercise Name 4 resisted R LAQ's with 3 lb aw  -BS      Sets 4 2  -BS      Reps 4 20  -BS              Exercise 5    Exercise Name 5 seated resisted HS curls w/ black TB  -BS      Sets 5 1  -BS      Reps 5 20  -BS              Exercise 6    Exercise Name 6 standing HR/TR  -BS      Sets 6 2  -BS      Reps 6 10  -BS              Exercise 7    Exercise Name 7 standing gastroc S, heels off step  -BS      Sets 7 1  -BS      Reps 7 3  -BS      Time 7 30\" hold  -BS              Exercise 8    Exercise Name 8 standing soleus S  -BS      Sets 8 1  -BS      Reps 8 3  -BS      Time 8 30\" hold  -BS              Exercise 9    Exercise Name 9 given ice to go  -BS              Exercise 10    Exercise Name 10 SLS  -BS      Reps 10 30\" on R  -BS      Time 10 25\" on L  -BS            User Key  (r) = Recorded By, (t) = Taken By, (c) = Cosigned By    Initials Name Provider Type    Juanito Strong, PT Physical Therapist                         Manual Rx (last 36 hours)     Manual Treatments     Row Name 10/07/22 1025             Manual Rx 1    Manual Rx 1 Location prone: gastrocnemius  -BS      Manual Rx 1 Type pin/stretch, split gastroc head, opposable thumbs, trigger point release (MFR/TrP)  -BS      Manual Rx 1 Duration 25'  -BS            User Key  (r) = Recorded By, (t) = Taken By, (c) = Cosigned By    Initials Name Provider Type    Juanito Strong PT Physical Therapist                 PT OP Goals     Row Name " 10/07/22 1100 10/07/22 1025       PT Short Term Goals    STG Date to Achieve -- 10/12/22  -BS    STG 1 -- Patient independent with HEP  -BS    STG 1 Progress -- New  -BS    STG 2 -- Improve R knee AROM to 0-0-145°  -BS    STG 2 Progress -- Ongoing  -BS    STG 3 -- Improve R knee ext MMT to 5/5  -BS    STG 3 Progress -- Ongoing  -BS    STG 4 -- Improve R hip adduction MMT to 5/5  -BS    STG 4 Progress -- Ongoing  -BS    STG 5 -- Improve R SLS to >/=30 sec consistently  -BS    STG 5 Progress -- Ongoing;Progressing  -BS    STG 6 -- Able to ascend/descend 15 steps with step over step pattern mod I with use of handrail with non-antalgic gait pattern  -BS    STG 6 Progress -- Ongoing  -BS       Time Calculation    PT Goal Re-Cert Due Date --  - 10/19/22  -BS          User Key  (r) = Recorded By, (t) = Taken By, (c) = Cosigned By    Initials Name Provider Type    Juanito Strong, PT Physical Therapist                               Time Calculation:   Start Time: 1026  Stop Time: 1121  Time Calculation (min): 55 min  Total Timed Code Minutes- PT: 55 minute(s)  Therapy Charges for Today     Code Description Service Date Service Provider Modifiers Qty    65946605915 HC PT THER PROC EA 15 MIN 10/7/2022 Juanito Floyd, PT GP 2    93208209786 HC PT MANUAL THERAPY EA 15 MIN 10/7/2022 Juanito Floyd, PT GP 2                    Juanito Floyd PT  10/7/2022

## 2022-10-11 ENCOUNTER — HOSPITAL ENCOUNTER (OUTPATIENT)
Dept: PHYSICAL THERAPY | Facility: HOSPITAL | Age: 47
Setting detail: THERAPIES SERIES
Discharge: HOME | End: 2022-10-11

## 2022-10-11 DIAGNOSIS — M23.91 INTERNAL DERANGEMENT OF RIGHT KNEE: Primary | ICD-10-CM

## 2022-10-11 DIAGNOSIS — M25.561 CHRONIC PAIN OF RIGHT KNEE: ICD-10-CM

## 2022-10-11 DIAGNOSIS — G89.29 CHRONIC PAIN OF RIGHT KNEE: ICD-10-CM

## 2022-10-11 DIAGNOSIS — S83.241A ACUTE MEDIAL MENISCUS TEAR OF RIGHT KNEE, INITIAL ENCOUNTER: ICD-10-CM

## 2022-10-11 PROCEDURE — 97530 THERAPEUTIC ACTIVITIES: CPT

## 2022-10-11 PROCEDURE — 97110 THERAPEUTIC EXERCISES: CPT

## 2022-10-11 NOTE — THERAPY TREATMENT NOTE
Outpatient Physical Therapy Ortho Treatment Note  [facility]     Patient Name: Ellie Gross  : 1975  MRN: [id number]  Today's Date: 10/11/2022      Visit Date: 10/11/2022     Subjective Improvement better  Visits 4/4  Visits approved ?  RTMD 2022  Recert Date 10-    S/P R Knee scope 2022      Visit Dx:    ICD-10-CM ICD-9-CM   1. Internal derangement of right knee  M23.91 717.9   2. Acute medial meniscus tear of right knee, initial encounter  S83.241A 836.0   3. Chronic pain of right knee  M25.561 719.46    G89.29 338.29       Patient Active Problem List   Diagnosis   • Difficulty swallowing solids   • Dyspepsia   • HTN (hypertension), benign   • Morbid obesity (HCC)   • Obesity, Class II, BMI 35-39.9   • Diabetes mellitus type 2 in obese ([facility])   • Hyperlipidemia   • Chest pain   • Chronic pain of right knee   • Internal derangement of right knee   • Acute medial meniscus tear of right knee   • Type 2 diabetes mellitus without complication, without long-term current use of insulin ([facility])   • Status post arthroscopic surgery of right knee        Past Medical History:   Diagnosis Date   • Acid reflux     on Nexium; still moderate despite PPI   • Anxiety    • Depression    • Dysphagia    • Elevated cholesterol     on medication   • Foot pain     ball of foot    • Heartburn    • Hemorrhoids    • Hypertension     on meds; runs normal at home   • IBS (irritable bowel syndrome)    • Joint pain    • Knee pain    • PONV (postoperative nausea and vomiting)    • Snoring    • SOB (shortness of breath) on exertion     deconditioned   • Type 2 diabetes mellitus ([facility])     A1C was 6.8%   • Wears glasses         Past Surgical History:   Procedure Laterality Date   • APPENDECTOMY N/A 2022    Procedure: APPENDECTOMY LAPAROSCOPIC;  Surgeon: Cely Smyth MD;  Location: [facility] OR;  Service: General;  Laterality: N/A;   • BLADDER SURGERY      TVT   • CHOLECYSTECTOMY      Laparoscopic    "  • ENDOSCOPY  09/23/2014    Distal esophageal ring dilated 50 [place]   • ENDOSCOPY N/A 12/30/2016    Procedure: ESOPHAGOGASTRODUODENOSCOPY WITH ANESTHESIA;  Surgeon: Maged Rios MD;  Location:  [facility];  Service:    • KNEE ARTHROSCOPY Right 9/26/2022    Procedure: RIGHT KNEE ARTHROSCOPY WITH DEBRIDEMENT MEDIAL MENISCUS;  Surgeon: Dionte Lopez MD;  Location: [facility] OR;  Service: Orthopedics;  Laterality: Right;   • KNEE ARTHROSCOPY W/ DEBRIDEMENT Right         PT Ortho     Row Name 10/11/22 1300       Precautions and Contraindications    Precautions S/P R knee scope 9-  -CP    Contraindications 2 wks  1 day  -CP       Subjective Pain    Able to rate subjective pain? yes  -CP    Pre-Treatment Pain Level 2  -CP    Subjective Pain Comment more stiff  -CP       Right Lower Ext    Rt Knee Extension/Flexion AROM 0-136  -CP    RT Lower Extremity Comments Right ankle DF 8  -CP          User Key  (r) = Recorded By, (t) = Taken By, (c) = Cosigned By    Initials Name Provider Type    Tami Hernandez PTA Physical Therapist Assistant                             PT Assessment/Plan     Row Name 10/11/22 1349          PT Assessment    Assessment Comments Increase noted in R DF today.  Patient was able to heel and toe walk.  -CP        PT Plan    PT Frequency 1x/week;2x/week  -CP     Predicted Duration of Therapy Intervention (PT) 2-3 weeks  -CP     PT Plan Comments Cont with POC.  step up 6\" tke with tband  -CP           User Key  (r) = Recorded By, (t) = Taken By, (c) = Cosigned By    Initials Name Provider Type    Tami Hernandez PTA Physical Therapist Assistant                 Modalities     Row Name 10/11/22 1300             Ice    Ice Applied Yes  -CP      Location right knee  -CP      PT Ice Rx Minutes 15  -CP      Ice S/P Rx Yes  -CP            User Key  (r) = Recorded By, (t) = Taken By, (c) = Cosigned By    Initials Name Provider Type    Tami Hernandez PTA Physical Therapist " "Assistant               OP Exercises     Row Name 10/11/22 1402 10/11/22 1300          Subjective Comments    Subjective Comments -- Patient states that she feels more stiff than pain.  She does reports tightness in calf area.  Reports increase motion in right ankle and over all decrease pain in calf area.  -CP        Subjective Pain    Able to rate subjective pain? -- yes  -CP     Pre-Treatment Pain Level -- 2  -CP     Subjective Pain Comment -- more stiff  -CP        Total Minutes    95888 - PT Therapeutic Exercise Minutes 35  -CP --     60467 - PT Therapeutic Activity Minutes 10  -CP --        Exercise 1    Exercise Name 1 -- Pro Ii level 3  -CP     Time 1 -- 10  -CP        Exercise 2    Exercise Name 2 -- incline stretch  -CP     Cueing 2 -- Verbal;Demo  -CP     Sets 2 -- 3  -CP     Time 2 -- 30  -CP        Exercise 3    Exercise Name 3 -- Standing HS strethc  -CP     Cueing 3 -- Verbal;Demo  -CP     Sets 3 -- 3  -CP     Time 3 -- 30  -CP        Exercise 4    Exercise Name 4 -- lunge stretch  -CP     Cueing 4 -- Verbal;Demo  -CP     Sets 4 -- 3  -CP     Time 4 -- 30  -CP        Exercise 5    Exercise Name 5 -- step up 4\"  -CP     Cueing 5 -- Verbal;Demo  -CP     Sets 5 -- 2  -CP     Reps 5 -- 10  -CP        Exercise 6    Exercise Name 6 -- measurements  -CP        Exercise 7    Exercise Name 7 -- SAQ  -CP     Cueing 7 -- Verbal;Tactile  -CP     Sets 7 -- 2  -CP     Reps 7 -- 10  -CP     Time 7 -- 5\" holds  -CP     Additional Comments -- 1 lb AW  -CP        Exercise 8    Exercise Name 8 -- lat step up 4\"  -CP     Cueing 8 -- Verbal;Demo  -CP     Sets 8 -- 2  -CP     Reps 8 -- 10  -CP        Exercise 9    Exercise Name 9 -- gait in gym  -CP     Sets 9 -- 2 laps  -CP        Exercise 10    Exercise Name 10 -- hurdles forward  -CP     Cueing 10 -- Verbal;Demo  -CP     Reps 10 -- 5  -CP        Exercise 11    Exercise Name 11 -- hurdles side stepping  -CP     Cueing 11 -- Verbal;Demo  -CP     Reps 11 -- 5  -CP        " Exercise 12    Exercise Name 12 -- heel walking  -CP     Cueing 12 -- Verbal;Demo  -CP     Reps 12 -- 2  -CP     Time 12 -- 10 ft  -CP     Additional Comments -- handrail assist  -CP        Exercise 13    Exercise Name 13 -- toe walking  -CP     Cueing 13 -- Verbal  -CP     Sets 13 -- 2  -CP     Reps 13 -- 10 ft  -CP     Time 13 -- independent  -CP           User Key  (r) = Recorded By, (t) = Taken By, (c) = Cosigned By    Initials Name Provider Type    CP Tami Saldana, PTA Physical Therapist Assistant                              PT OP Goals     Row Name 10/11/22 1300          PT Short Term Goals    STG Date to Achieve 10/12/22  -CP     STG 1 Patient independent with HEP  -CP     STG 1 Progress New  -CP     STG 2 Improve R knee AROM to 0-0-145°  -CP     STG 2 Progress Partially Met  -CP     STG 3 Improve R knee ext MMT to 5/5  -CP     STG 3 Progress Ongoing  -CP     STG 4 Improve R hip adduction MMT to 5/5  -CP     STG 4 Progress Ongoing  -CP     STG 5 Improve R SLS to >/=30 sec consistently  -CP     STG 5 Progress Ongoing;Progressing  -CP     STG 6 Able to ascend/descend 15 steps with step over step pattern mod I with use of handrail with non-antalgic gait pattern  -CP     STG 6 Progress Ongoing  -CP        Time Calculation    PT Goal Re-Cert Due Date 10/18/22  -CP           User Key  (r) = Recorded By, (t) = Taken By, (c) = Cosigned By    Initials Name Provider Type    CP Tami Saldana PTA Physical Therapist Assistant                               Time Calculation:   Start Time: 1300  Stop Time: 1400  Time Calculation (min): 60 min  Total Timed Code Minutes- PT: 45 minute(s)  Timed Charges  66341 - PT Therapeutic Exercise Minutes: 35  16027 - PT Therapeutic Activity Minutes: 10  Untimed Charges  PT Ice Rx Minutes: 15  Total Minutes  Timed Charges Total Minutes: 45  Untimed Charges Total Minutes: 15   Total Minutes: 45  Therapy Charges for Today     Code Description Service Date Service Provider  Modifiers Qty    64486041094 HC PT THER SUPP EA 15 MIN 10/11/2022 Tami Saldana, PTA GP 1    61599524504 HC PT THERAPEUTIC ACT EA 15 MIN 10/11/2022 Tami Saldana, BRITTNEE GP, CQ 1    06786278182 HC PT THER PROC EA 15 MIN 10/11/2022 Tami Saldana PTA GP, CQ 2                    Tami Saldana PTA  10/11/2022

## 2022-10-14 ENCOUNTER — HOSPITAL ENCOUNTER (OUTPATIENT)
Dept: PHYSICAL THERAPY | Facility: HOSPITAL | Age: 47
Setting detail: THERAPIES SERIES
Discharge: HOME | End: 2022-10-14

## 2022-10-14 DIAGNOSIS — M25.561 CHRONIC PAIN OF RIGHT KNEE: ICD-10-CM

## 2022-10-14 DIAGNOSIS — G89.29 CHRONIC PAIN OF RIGHT KNEE: ICD-10-CM

## 2022-10-14 DIAGNOSIS — S83.241A ACUTE MEDIAL MENISCUS TEAR OF RIGHT KNEE, INITIAL ENCOUNTER: ICD-10-CM

## 2022-10-14 DIAGNOSIS — M23.91 INTERNAL DERANGEMENT OF RIGHT KNEE: Primary | ICD-10-CM

## 2022-10-14 PROCEDURE — 97110 THERAPEUTIC EXERCISES: CPT | Performed by: PHYSICAL THERAPIST

## 2022-10-14 NOTE — THERAPY DISCHARGE NOTE
Outpatient Physical Therapy Ortho Treatment Note/Discharge Summary  [facility]     Patient Name: Ellie Gross  : 1975  MRN: [id number]  Today's Date: 10/14/2022      Visit Date: 10/14/2022  Subjective Improvement 90%  Visits 5/5  Visits approved ?  RTMD 2022  Recert Date 10-     S/P R Knee scope 2022    Visit Dx:    ICD-10-CM ICD-9-CM   1. Internal derangement of right knee  M23.91 717.9   2. Acute medial meniscus tear of right knee, initial encounter  S83.241A 836.0   3. Chronic pain of right knee  M25.561 719.46    G89.29 338.29       Patient Active Problem List   Diagnosis   • Difficulty swallowing solids   • Dyspepsia   • HTN (hypertension), benign   • Morbid obesity (HCC)   • Obesity, Class II, BMI 35-39.9   • Diabetes mellitus type 2 in obese ([facility])   • Hyperlipidemia   • Chest pain   • Chronic pain of right knee   • Internal derangement of right knee   • Acute medial meniscus tear of right knee   • Type 2 diabetes mellitus without complication, without long-term current use of insulin ([facility])   • Status post arthroscopic surgery of right knee        Past Medical History:   Diagnosis Date   • Acid reflux     on Nexium; still moderate despite PPI   • Anxiety    • Depression    • Dysphagia    • Elevated cholesterol     on medication   • Foot pain     ball of foot    • Heartburn    • Hemorrhoids    • Hypertension     on meds; runs normal at home   • IBS (irritable bowel syndrome)    • Joint pain    • Knee pain    • PONV (postoperative nausea and vomiting)    • Snoring    • SOB (shortness of breath) on exertion     deconditioned   • Type 2 diabetes mellitus ([facility])     A1C was 6.8%   • Wears glasses         Past Surgical History:   Procedure Laterality Date   • APPENDECTOMY N/A 2022    Procedure: APPENDECTOMY LAPAROSCOPIC;  Surgeon: Cely Smyth MD;  Location: [facility] OR;  Service: General;  Laterality: N/A;   • BLADDER SURGERY      TVT   • CHOLECYSTECTOMY       Laparoscopic     • ENDOSCOPY  09/23/2014    Distal esophageal ring dilated 50 [place]   • ENDOSCOPY N/A 12/30/2016    Procedure: ESOPHAGOGASTRODUODENOSCOPY WITH ANESTHESIA;  Surgeon: Maged Rios MD;  Location:  [facility];  Service:    • KNEE ARTHROSCOPY Right 9/26/2022    Procedure: RIGHT KNEE ARTHROSCOPY WITH DEBRIDEMENT MEDIAL MENISCUS;  Surgeon: Dionte Lopez MD;  Location: [facility] OR;  Service: Orthopedics;  Laterality: Right;   • KNEE ARTHROSCOPY W/ DEBRIDEMENT Right         PT Ortho     Row Name 10/14/22 1100       Subjective Comments    Subjective Comments 90% improvement. Feels a little fatigue still from lack of coniditioning over the past year since her knee injury forcing more inactivity.  -BS       Precautions and Contraindications    Precautions S/P R knee scope 9-  -BS       Subjective Pain    Able to rate subjective pain? yes  -BS    Pre-Treatment Pain Level 0  -BS       Right Lower Ext    Rt Knee Extension/Flexion AROM 0-146°  -BS       MMT (Manual Muscle Testing)    General MMT Comments R knee 5/5 (flex/ext) R ankle DF 5/5 R hip adduction 4/5 R hip extension 4/5  -BS          User Key  (r) = Recorded By, (t) = Taken By, (c) = Cosigned By    Initials Name Provider Type    Juanito Strong, PT Physical Therapist                             PT Assessment/Plan     Row Name 10/14/22 1100          PT Assessment    Assessment Comments Met 4 of 6 goals, pt very pleases with progress made with PT and wanting to d/c at this time.  -BS        PT Plan    PT Plan Comments D/c'd per pt request and excellent progress made toward goals. Pt expressed will follow up with her gym membership to advance hip/knee strengthening. Declined free 1 month [employer] membership offer.  -BS           User Key  (r) = Recorded By, (t) = Taken By, (c) = Cosigned By    Initials Name Provider Type    Juanito Strong, PT Physical Therapist                     OP Exercises     Row Name 10/14/22 1100              "Subjective Comments    Subjective Comments 90% improvement. Feels a little fatigue still from lack of coniditioning over the past year since her knee injury forcing more inactivity.  -BS         Subjective Pain    Able to rate subjective pain? yes  -BS      Pre-Treatment Pain Level 0  -BS      Post-Treatment Pain Level 0  -BS         Exercise 1    Exercise Name 1 Pro II level 4  -BS      Time 1 10  -BS         Exercise 2    Exercise Name 2 fwd lunge S  -BS      Sets 2 1  -BS      Reps 2 10  -BS      Time 2 5\" HOLD  -BS         Exercise 3    Exercise Name 3 standing R HS S  -BS      Sets 3 3  -BS      Time 3 30 sec hold  -BS         Exercise 4    Exercise Name 4 supine R heel slides w/ strap  -BS      Sets 4 1  -BS      Reps 4 15  -BS         Exercise 5    Exercise Name 5 SL R hip adduction AROM  -BS      Sets 5 1  -BS      Reps 5 10  -BS         Exercise 6    Exercise Name 6 prone R hip extension AROM  -BS      Sets 6 1  -BS      Reps 6 10  -BS         Exercise 7    Exercise Name 7 quadruped opp leg lifts  -BS      Sets 7 1  -BS      Reps 7 10 ea  -BS         Exercise 8    Exercise Name 8 multihip machine  -BS      Sets 8 1  -BS      Reps 8 20 ea  -BS      Additional Comments 3 plates-ext/add  -BS         Exercise 9    Exercise Name 9 up/down 17 steps in hallway stairwell, indep with step over step pattern, non-antalgic, no use of handrail needed  -BS      Time 9 1 rep  -BS         Exercise 10    Exercise Name 10 SLS, R  -BS      Time 10 28 sec  -BS            User Key  (r) = Recorded By, (t) = Taken By, (c) = Cosigned By    Initials Name Provider Type    Juanito Strong, PT Physical Therapist                                PT OP Goals     Row Name 10/14/22 1100          PT Short Term Goals    STG Date to Achieve 10/12/22  -BS     STG 1 Patient independent with HEP  -BS     STG 1 Progress Met  -BS     STG 2 Improve R knee AROM to 0-0-145°  -BS     STG 2 Progress Met  -BS     STG 3 Improve R knee ext MMT to 5/5  " -BS     STG 3 Progress Met  -BS     STG 4 Improve R hip adduction MMT to 5/5  -BS     STG 4 Progress Not Met  -BS     STG 5 Improve R SLS to >/=30 sec consistently  -BS     STG 5 Progress Not Met  28 sec, just shy of 30 sec goal  -BS     STG 6 Able to ascend/descend 15 steps with step over step pattern mod I with use of handrail with non-antalgic gait pattern  -BS     STG 6 Progress Met  -BS        Time Calculation    PT Goal Re-Cert Due Date 10/18/22  -BS           User Key  (r) = Recorded By, (t) = Taken By, (c) = Cosigned By    Initials Name Provider Type    Juanito Strong, PT Physical Therapist                               Time Calculation:   Start Time: 1100  Stop Time: 1146  Time Calculation (min): 46 min  Total Timed Code Minutes- PT: 46 minute(s)  Therapy Charges for Today     Code Description Service Date Service Provider Modifiers Qty    88943500067 HC PT THER PROC EA 15 MIN 10/14/2022 Juanito Floyd, PT GP 3                OP PT Discharge Summary  Date of Discharge: 10/14/22  Reason for Discharge: Maximum functional potential achieved, Patient/Caregiver request  Outcomes Achieved: Patient able to partially acheive established goals  Discharge Destination: Home with home program  Discharge Instructions/Additional Comments: pt to continue knee strengthening at local gym in [facility].      Juanito Floyd PT  10/14/2022

## 2022-10-18 ENCOUNTER — APPOINTMENT (OUTPATIENT)
Dept: PHYSICAL THERAPY | Facility: HOSPITAL | Age: 47
End: 2022-10-18

## 2022-10-20 ENCOUNTER — TRANSCRIBE ORDERS (OUTPATIENT)
Dept: ADMINISTRATIVE | Facility: HOSPITAL | Age: 47
End: 2022-10-20

## 2022-10-20 DIAGNOSIS — Z12.31 ENCOUNTER FOR SCREENING MAMMOGRAM FOR MALIGNANT NEOPLASM OF BREAST: Primary | ICD-10-CM

## 2022-10-21 ENCOUNTER — APPOINTMENT (OUTPATIENT)
Dept: PHYSICAL THERAPY | Facility: HOSPITAL | Age: 47
End: 2022-10-21

## 2022-10-25 ENCOUNTER — LAB (OUTPATIENT)
Dept: LAB | Facility: HOSPITAL | Age: 47
End: 2022-10-25

## 2022-10-25 ENCOUNTER — OFFICE VISIT (OUTPATIENT)
Dept: OBSTETRICS AND GYNECOLOGY | Facility: CLINIC | Age: 47
End: 2022-10-25

## 2022-10-25 ENCOUNTER — HOSPITAL ENCOUNTER (OUTPATIENT)
Dept: MAMMOGRAPHY | Facility: HOSPITAL | Age: 47
Discharge: HOME | End: 2022-10-25
Admitting: NURSE PRACTITIONER

## 2022-10-25 VITALS
SYSTOLIC BLOOD PRESSURE: 116 MMHG | HEIGHT: 63 IN | DIASTOLIC BLOOD PRESSURE: 80 MMHG | WEIGHT: 183 LBS | BODY MASS INDEX: 32.43 KG/M2

## 2022-10-25 DIAGNOSIS — Z80.0 FAMILY HISTORY OF PANCREATIC CANCER: Primary | ICD-10-CM

## 2022-10-25 DIAGNOSIS — Z01.419 WELL WOMAN EXAM WITH ROUTINE GYNECOLOGICAL EXAM: Primary | ICD-10-CM

## 2022-10-25 DIAGNOSIS — N95.1 MENOPAUSAL SYMPTOMS: ICD-10-CM

## 2022-10-25 DIAGNOSIS — Z80.3 FAMILY HISTORY OF BREAST CANCER: ICD-10-CM

## 2022-10-25 DIAGNOSIS — Z80.0 FAMILY HISTORY OF PANCREATIC CANCER: ICD-10-CM

## 2022-10-25 DIAGNOSIS — Z12.31 ENCOUNTER FOR SCREENING MAMMOGRAM FOR BREAST CANCER: ICD-10-CM

## 2022-10-25 DIAGNOSIS — E11.9 TYPE 2 DIABETES MELLITUS WITHOUT COMPLICATION, WITHOUT LONG-TERM CURRENT USE OF INSULIN: ICD-10-CM

## 2022-10-25 DIAGNOSIS — Z12.31 ENCOUNTER FOR SCREENING MAMMOGRAM FOR MALIGNANT NEOPLASM OF BREAST: ICD-10-CM

## 2022-10-25 PROCEDURE — 87624 HPV HI-RISK TYP POOLED RSLT: CPT | Performed by: NURSE PRACTITIONER

## 2022-10-25 PROCEDURE — 99396 PREV VISIT EST AGE 40-64: CPT | Performed by: NURSE PRACTITIONER

## 2022-10-25 PROCEDURE — 77063 BREAST TOMOSYNTHESIS BI: CPT

## 2022-10-25 PROCEDURE — 77067 SCR MAMMO BI INCL CAD: CPT

## 2022-10-25 PROCEDURE — G0123 SCREEN CERV/VAG THIN LAYER: HCPCS | Performed by: NURSE PRACTITIONER

## 2022-10-25 RX ORDER — MEDROXYPROGESTERONE ACETATE 2.5 MG/1
2.5 TABLET ORAL DAILY
Qty: 30 TABLET | Refills: 12 | Status: SHIPPED | OUTPATIENT
Start: 2022-10-25

## 2022-10-25 RX ORDER — ESTRADIOL 1 MG/1
1.5 TABLET ORAL DAILY
Qty: 45 TABLET | Refills: 12 | Status: SHIPPED | OUTPATIENT
Start: 2022-10-25

## 2022-10-26 DIAGNOSIS — R92.8 ABNORMAL MAMMOGRAM: Primary | ICD-10-CM

## 2022-10-26 LAB
GEN CATEG CVX/VAG CYTO-IMP: NORMAL
HPV I/H RISK 4 DNA CVX QL PROBE+SIG AMP: NOT DETECTED
LAB AP CASE REPORT: NORMAL
LAB AP GYN ADDITIONAL INFORMATION: NORMAL
LAB AP GYN OTHER FINDINGS: NORMAL
Lab: NORMAL
PATH INTERP SPEC-IMP: NORMAL
STAT OF ADQ CVX/VAG CYTO-IMP: NORMAL

## 2022-10-27 ENCOUNTER — HOSPITAL ENCOUNTER (OUTPATIENT)
Dept: MAMMOGRAPHY | Facility: HOSPITAL | Age: 47
Discharge: HOME | End: 2022-10-27

## 2022-10-27 ENCOUNTER — HOSPITAL ENCOUNTER (OUTPATIENT)
Dept: ULTRASOUND IMAGING | Facility: HOSPITAL | Age: 47
Discharge: HOME | End: 2022-10-27

## 2022-10-27 DIAGNOSIS — R92.8 ABNORMAL MAMMOGRAM: ICD-10-CM

## 2022-10-27 PROCEDURE — 77065 DX MAMMO INCL CAD UNI: CPT

## 2022-10-27 PROCEDURE — G0279 TOMOSYNTHESIS, MAMMO: HCPCS

## 2022-10-27 PROCEDURE — 76642 ULTRASOUND BREAST LIMITED: CPT

## 2022-10-28 ENCOUNTER — APPOINTMENT (OUTPATIENT)
Dept: PHYSICAL THERAPY | Facility: HOSPITAL | Age: 47
End: 2022-10-28

## 2022-11-03 ENCOUNTER — OFFICE VISIT (OUTPATIENT)
Dept: ORTHOPEDIC SURGERY | Facility: CLINIC | Age: 47
End: 2022-11-03

## 2022-11-03 VITALS — WEIGHT: 181.6 LBS | BODY MASS INDEX: 32.18 KG/M2 | HEIGHT: 63 IN

## 2022-11-03 DIAGNOSIS — Z98.890 STATUS POST ARTHROSCOPIC SURGERY OF RIGHT KNEE: Primary | ICD-10-CM

## 2022-11-03 PROCEDURE — 99024 POSTOP FOLLOW-UP VISIT: CPT | Performed by: NURSE PRACTITIONER

## 2022-11-03 NOTE — PROGRESS NOTES
Ellie Gross is a 47 y.o. female is s/p       Chief Complaint   Patient presents with   • Right Knee - Post-op       HISTORY OF PRESENT ILLNESS: This 47-year-old female patient presents today for a 5-week 3-day follow-up status post right arthroscopic knee procedure with debridement of the medial meniscus.  This procedure was performed by Dr. Lopez on 9/26/2022.  This patient has no complaints and reports she is doing great.  Denies numbness and tingling.     09/26/22 (5w 3d) Dionte Lopez MD    Right Knee Arthroscopy With Debridement Medial Meniscus - Right            No Known Allergies      Current Outpatient Medications:   •  ALPRAZolam (XANAX) 0.25 MG tablet, Take 0.25 mg by mouth Daily As Needed., Disp: , Rfl:   •  BuPROPion HCl (WELLBUTRIN PO), Take 300 mg by mouth Daily., Disp: , Rfl:   •  Cholecalciferol (VITAMIN D3 PO), Take 25 mcg by mouth Daily., Disp: , Rfl:   •  esomeprazole (nexIUM) 40 MG capsule, Take 40 mg by mouth Every Morning Before Breakfast., Disp: , Rfl:   •  estradiol (ESTRACE) 1 MG tablet, Take 1.5 tablets by mouth Daily., Disp: 45 tablet, Rfl: 12  •  fenofibrate (TRICOR) 145 MG tablet, Take 145 mg by mouth Daily., Disp: , Rfl:   •  FLUoxetine (PROzac) 20 MG capsule, Take 20 mg by mouth Daily., Disp: , Rfl:   •  glipizide (GLUCOTROL XL) 10 MG 24 hr tablet, Take 10 mg by mouth Daily., Disp: , Rfl:   •  losartan-hydrochlorothiazide (HYZAAR) 100-25 MG per tablet, Take 1 tablet by mouth Daily., Disp: , Rfl:   •  medroxyPROGESTERone (PROVERA) 2.5 MG tablet, Take 1 tablet by mouth Daily., Disp: 30 tablet, Rfl: 12  •  pioglitazone (ACTOS) 30 MG tablet, Take 30 mg by mouth Daily., Disp: , Rfl:   •  rosuvastatin (CRESTOR) 10 MG tablet, Take 10 mg by mouth Daily., Disp: , Rfl:   •  TRULICITY 1.5 MG/0.5ML solution pen-injector, Inject 1.5 mg as directed 1 (One) Time Per Week. Takes on Sundays, Disp: , Rfl:     No fevers or chills.  No nausea or vomiting.      PHYSICAL EXAMINATION:        Ellie Gross is a 47 y.o. female    Patient is awake and alert, answers questions appropriately and is in no apparent distress.    GAIT:     [x]  Normal  []  Antalgic    Assistive device: [x]  None  []  Walker     []  Crutches  []  Cane     []  Wheelchair  []  Stretcher    Right Knee Exam     Tenderness   The patient is experiencing no tenderness.     Range of Motion   Extension: 0   Flexion: 110     Other   Erythema: absent  Scars: present  Sensation: normal  Pulse: present  Swelling: none  Effusion: no effusion present              Procedure:  XR KNEE BILATERAL AP STANDING  Exam Date:  8/18/22     COMPARISON:  Not applicable, no relevant images available.     IMPRESSION: AP bilateral standing of the knees with lateral of the right knee show acceptable position and alignment of both knees with no evidence of acute bony abnormality.  Mild varus positioning is noted in both knees.  She has mild to moderate medial joint space narrowing in the right knee.  Approximately 45 to 50% medial joint space narrowing.  No significant arthritic change noted in the patellofemoral compartment and the articular surfaces remain smooth on the lateral x-ray as well.  No acute findings.  Mild arthritic changes noted in the left knee with varus positioning and a small marginal osteophyte along the medial joint space.        Dionte Lopez MD  8/18/22         ASSESSMENT:    Diagnoses and all orders for this visit:    Status post arthroscopic surgery of right knee          PLAN  Discussed with patient she has no real restrictions and may progress as tolerated.  Encourage patient to continue generalized strengthening and conditioning activities.  Recommended to follow-up as needed.    All questions and concerns are addressed with understanding noted. They are aware and are in agreement to this plan.    Return if symptoms worsen or fail to improve.    MALI Phillips     This document has been electronically  signed by MALI Phillips on November 3, 2022 08:44 CDT      EMR Dragon/Transciption Disclaimer: Some of this note may be an electronic transcription/translation of spoken language to printed text using the Dragon Dictation System.     Body mass index is 32.17 kg/m².

## 2023-02-10 ENCOUNTER — PROCEDURE VISIT (OUTPATIENT)
Dept: OTOLARYNGOLOGY | Facility: CLINIC | Age: 48
End: 2023-02-10

## 2023-02-10 DIAGNOSIS — L98.8 FACIAL RHYTIDS: ICD-10-CM

## 2023-02-10 PROCEDURE — KYSALESTAX: Performed by: OTOLARYNGOLOGY

## 2023-02-10 PROCEDURE — COS06: Performed by: OTOLARYNGOLOGY

## 2023-02-11 NOTE — PROGRESS NOTES
Procedure: Cosmetic chemical denervation for treatment of facial rhytids  Indication: Patient dissatisfaction with facial rhytids  EBL: None  Complications: None  Surgeon Samaria Snell MD  Anesthesia: None  Assistant: None  Specimen: None    Scription of procedure:  After informed consent was obtained and documented, the facial area of concern was gently cleansed with 70% isopropyl alcohol.  Using a 30-gauge needle, on the botulinum toxin at a concentration of 5 units per 0.1 mL was locally infiltrated in the below areas.  The patient tolerated the procedure well without known complication.    : 5 units and 2 areas for a total of 10 units  Procerus: 5 units    Total units: 15 units

## 2023-05-12 ENCOUNTER — PROCEDURE VISIT (OUTPATIENT)
Dept: OTOLARYNGOLOGY | Facility: CLINIC | Age: 48
End: 2023-05-12

## 2023-05-12 DIAGNOSIS — L98.8 FACIAL RHYTIDS: Primary | ICD-10-CM

## 2023-05-12 NOTE — PROGRESS NOTES
We did discuss the risks of chemical denervation with Botox including the potential for ptosis, usually lasting 1 to 2 months if present, the potential for incomplete effect, and the fact that it lasts about 3 to 4 months on average before wearing off.  We did discuss that Botox injected into the frontalis does cause the eyebrows to descend slightly given paralysis of that muscle.  This does not impact vision but can feel different.  The patient expressed understanding of these risks and wishes to proceed as discussed.    Procedure: Cosmetic chemical denervation for treatment of facial rhytids  Indication: Patient dissatisfaction with facial rhytids  EBL: None  Complications: None  Surgeon Samaria Snell MD  Anesthesia: None  Assistant: None  Specimen: None    Scription of procedure:  After informed consent was obtained and documented, the facial area of concern was gently cleansed with 70% isopropyl alcohol.  Using a 30-gauge needle, on the botulinum toxin at a concentration of 5 units per 0.1 mL was locally infiltrated in the below areas.  The patient tolerated the procedure well without known complication.    : 5 units and 2 areas for a total of 10 units  Procerus: 5 units    Total units: 15 units

## 2023-06-19 ENCOUNTER — OFFICE VISIT (OUTPATIENT)
Dept: GASTROENTEROLOGY | Facility: CLINIC | Age: 48
End: 2023-06-19
Payer: COMMERCIAL

## 2023-06-19 VITALS
DIASTOLIC BLOOD PRESSURE: 90 MMHG | BODY MASS INDEX: 27.11 KG/M2 | TEMPERATURE: 97.1 F | OXYGEN SATURATION: 99 % | HEART RATE: 92 BPM | HEIGHT: 63 IN | WEIGHT: 153 LBS | SYSTOLIC BLOOD PRESSURE: 140 MMHG

## 2023-06-19 DIAGNOSIS — I10 HTN (HYPERTENSION), BENIGN: ICD-10-CM

## 2023-06-19 DIAGNOSIS — R10.13 DYSPEPSIA: ICD-10-CM

## 2023-06-19 DIAGNOSIS — Z12.11 ENCOUNTER FOR SCREENING FOR MALIGNANT NEOPLASM OF COLON: Primary | ICD-10-CM

## 2023-06-19 DIAGNOSIS — R13.19 ESOPHAGEAL DYSPHAGIA: ICD-10-CM

## 2023-06-19 DIAGNOSIS — Z78.9 NONSMOKER: ICD-10-CM

## 2023-06-19 PROCEDURE — 99214 OFFICE O/P EST MOD 30 MIN: CPT | Performed by: CLINICAL NURSE SPECIALIST

## 2023-06-19 RX ORDER — TIRZEPATIDE 10 MG/.5ML
15 INJECTION, SOLUTION SUBCUTANEOUS
COMMUNITY
Start: 2023-02-28

## 2023-06-19 RX ORDER — SODIUM, POTASSIUM,MAG SULFATES 17.5-3.13G
SOLUTION, RECONSTITUTED, ORAL ORAL
Qty: 177 ML | Refills: 0 | Status: SHIPPED | OUTPATIENT
Start: 2023-06-19

## 2023-06-19 NOTE — PROGRESS NOTES
Ellie Gross  1975 6/19/2023  Chief Complaint   Patient presents with    Colonoscopy     Subjective   HPI  Ellie Gross is a 47 y.o. female who presents as a referral for preventative maintenance. She has no complaints of nausea or vomiting. No change in bowels. No wt loss. No BRBPR. No melena. There is NO family hx for colon cancer. No abdominal pain.  She has a hx of dysphagia with large pills and foods somewhat. She says that dilatation has helped her. She has been on Nexium and that has helped her as well. Mid esophageal region with dry meat moderate for her comes and goes.   Past Medical History:   Diagnosis Date    Acid reflux     on Nexium; still moderate despite PPI    Anxiety     Depression     Dysphagia     Elevated cholesterol     on medication    Foot pain     ball of foot     Heartburn     Hemorrhoids     Hypertension     on meds; runs normal at home    IBS (irritable bowel syndrome)     Joint pain     Knee pain     PONV (postoperative nausea and vomiting)     Snoring     SOB (shortness of breath) on exertion     deconditioned    Type 2 diabetes mellitus     A1C was 6.8%    Wears glasses      Past Surgical History:   Procedure Laterality Date    APPENDECTOMY N/A 01/28/2022    Procedure: APPENDECTOMY LAPAROSCOPIC;  Surgeon: Cely Smyth MD;  Location:  [facility] OR;  Service: General;  Laterality: N/A;    BLADDER SURGERY      TVT    CHOLECYSTECTOMY  2007    Laparoscopic      ENDOSCOPY  09/23/2014    Distal esophageal ring dilated 50 [place]    ENDOSCOPY N/A 12/30/2016    Esophagus dilated no abnormality found to explain dysphagia, HH repeat prn    KNEE ARTHROSCOPY Right 09/26/2022    Procedure: RIGHT KNEE ARTHROSCOPY WITH DEBRIDEMENT MEDIAL MENISCUS;  Surgeon: Dionte Lopez MD;  Location:  [facility] OR;  Service: Orthopedics;  Laterality: Right;    KNEE ARTHROSCOPY W/ DEBRIDEMENT Right      Outpatient Medications Marked as Taking for the 6/19/23 encounter (Office Visit) with  Raquel Wright APRN   Medication Sig Dispense Refill    ALPRAZolam (XANAX) 0.25 MG tablet Take 1 tablet by mouth Daily As Needed.      BuPROPion HCl (WELLBUTRIN PO) Take 300 mg by mouth Daily.      Cholecalciferol (VITAMIN D3 PO) Take 25 mcg by mouth Daily.      esomeprazole (nexIUM) 40 MG capsule Take 1 capsule by mouth Every Morning Before Breakfast.      estradiol (ESTRACE) 1 MG tablet Take 1.5 tablets by mouth Daily. 45 tablet 12    fenofibrate (TRICOR) 145 MG tablet Take 1 tablet by mouth Daily.      FLUoxetine (PROzac) 20 MG capsule Take 1 capsule by mouth Daily.      losartan-hydrochlorothiazide (HYZAAR) 100-25 MG per tablet Take 1 tablet by mouth Daily.      medroxyPROGESTERone (PROVERA) 2.5 MG tablet Take 1 tablet by mouth Daily. 30 tablet 12    Mounjaro 10 MG/0.5ML solution pen-injector Inject 15 mg as directed Every 7 (Seven) Days.      rosuvastatin (CRESTOR) 10 MG tablet Take 1 tablet by mouth Daily.       No Known Allergies  Social History     Socioeconomic History    Marital status:    Tobacco Use    Smoking status: Former     Packs/day: 0.50     Years: 3.00     Pack years: 1.50     Types: Cigarettes     Quit date:      Years since quittin.4    Smokeless tobacco: Never   Vaping Use    Vaping Use: Never used   Substance and Sexual Activity    Alcohol use: Yes     Comment: occasional    Drug use: No    Sexual activity: Defer     Partners: Male     Birth control/protection: Surgical     Comment:      Family History   Problem Relation Age of Onset    Colon polyps Father     Hypertension Father     Hypertension Mother     Breast cancer Mother 68        Ductal with 1 node, chemo and radiation    Pancreatic cancer Mother     Renal tubular acidosis Mother     Hypertension Sister     Hypertension Sister     Heart disease Paternal Grandfather     Cancer Maternal Grandmother     Breast cancer Maternal Grandmother 30    Melanoma Maternal Uncle     Colon cancer Neg Hx     Ovarian  "cancer Neg Hx     Uterine cancer Neg Hx     Prostate cancer Neg Hx      Health Maintenance   Topic Date Due    Hepatitis B (1 of 3 - 3-dose series) Never done    URINE MICROALBUMIN  Never done    COLORECTAL CANCER SCREENING  Never done    COVID-19 Vaccine (1) Never done    Pneumococcal Vaccine 0-64 (1 - PCV) Never done    TDAP/TD VACCINES (1 - Tdap) Never done    HEPATITIS C SCREENING  Never done    ANNUAL PHYSICAL  Never done    DIABETIC FOOT EXAM  Never done    LIPID PANEL  02/19/2020    HEMOGLOBIN A1C  02/19/2020    DIABETIC EYE EXAM  Never done    INFLUENZA VACCINE  10/01/2023    MAMMOGRAM  10/27/2023    PAP SMEAR  10/25/2025       REVIEW OF SYSTEMS  General: well appearing, no fever chills or sweats, no unexplained wt loss  HEENT: no acute visual or hearing disturbances  Cardiovascular: No chest pain or palpitations  Pulmonary: No shortness of breath, coughing, wheezing or hemoptysis  : No burning, urgency, hematuria, or dysuria  Musculoskeletal: No joint pain or stiffness  Peripheral: no edema  Skin: No lesions or rashes  Neuro: No dizziness, headaches, stroke, syncope  Endocrine: No hot or cold intolerances  Hematological: No blood dyscrasias    Objective   Vitals:    06/19/23 0940   BP: 140/90   Pulse: 92   Temp: 97.1 °F (36.2 °C)   TempSrc: Temporal   SpO2: 99%   Weight: 69.4 kg (153 lb)   Height: 160 cm (63\")     Body mass index is 27.1 kg/m².  BMI is >= 25 and <30. (Overweight) The following options were offered after discussion;: weight loss educational material (shared in after visit summary)      PHYSICAL EXAM  General: age appropriate well nourished well appearing, no acute distress  Head: normocephalic and atraumatic  Global assessment-supple  Neck-No JVD noted, no lymphadenopathy  Pulmonary-clear to auscultation bilaterally, normal respiratory effort  Cardiovascular-normal rate and rhythm, normal heart sounds, S1 and S2 noted  Abdomen-soft, non tender, non distended, normal bowel sounds all 4 " quadrants, no hepatosplenomegaly noted  Extremities-No clubbing cyanosis or edema  Neuro-Non focal, converses appropriately, awake, alert, oriented    Assessment & Plan     Diagnoses and all orders for this visit:    1. Encounter for screening for malignant neoplasm of colon (Primary)  -     Case Request; Standing  -     Case Request  -     sodium-potassium-magnesium sulfates (Suprep Bowel Prep Kit) 17.5-3.13-1.6 GM/177ML solution oral solution; Take as directed by office instructions provided  Dispense: 177 mL; Refill: 0    2. Esophageal dysphagia    3. Nonsmoker    4. HTN (hypertension), benign  Comments:  cont BP medication the day of procedure    Other orders  -     Implement Anesthesia Orders Day of Procedure; Standing  -     Obtain Informed Consent; Standing  -     Follow Anesthesia Guidelines / Protocol; Future  -     Obtain Informed Consent; Future  -     Verify Bowel Prep Was Successful; Standing    I discussed non pharmaceutical treatment of gerd.  This includes gradually losing weight to achieve ideal body wt., elevation of the head of bed by 4-6 inches, nothing to eat or drink 3 hours prior to lying down, avoiding tight clothing, stress reduction, tobacco cessation, reduction of alcohol intake, and dietary restrictions (avoiding caffeine, coffee, fatty foods, mints, chocolate, spicy foods and tomato based sauces as much as possible).      ESOPHAGOGASTRODUODENOSCOPY WITH ANESTHESIA (N/A), COLONOSCOPY WITH ANESTHESIA (N/A)  Body mass index is 27.1 kg/m².  There are no Patient Instructions on file for this visit.  Patient instructions on prep prior to procedure provided to the patient.    All risks, benefits, alternatives, and indications of colonoscopy procedure have been discussed with the patient. Risks to include perforation of the colon requiring possible surgery or colostomy, risk of bleeding from biopsies or removal of colon tissue, possibility of missing a colon polyp or cancer, or adverse drug  reaction.  Benefits to include the diagnosis and management of disease of the colon and rectum. Alternatives to include barium enema, radiographic evaluation, lab testing or no intervention. Pt verbalizes understanding and agrees.     Raquel Wright, APRN  2023  09:54 CDT      IF YOU SMOKE OR USE TOBACCO PLEASE READ THE FOLLOWIN minutes reading provided    Why is smoking bad for me?  Smoking increases the risk of heart disease, lung disease, vascular disease, stroke, and cancer.     If you smoke, STOP!    If you would like more information on quitting smoking, please visit the [employer] website: [email or web address]   This link will provide additional resources including the QUIT line and the Beat the Pack support groups.     For more information:    Quit Now [place]  -QUIT-NOW  [email or web address]

## 2023-06-20 PROBLEM — Z12.11 ENCOUNTER FOR SCREENING FOR MALIGNANT NEOPLASM OF COLON: Status: ACTIVE | Noted: 2023-06-20

## 2023-08-23 ENCOUNTER — PROCEDURE VISIT (OUTPATIENT)
Dept: OTOLARYNGOLOGY | Facility: CLINIC | Age: 48
End: 2023-08-23

## 2023-08-23 DIAGNOSIS — L98.8 FACIAL RHYTIDS: Primary | ICD-10-CM

## 2023-08-23 NOTE — PROGRESS NOTES
We did discuss the risks of chemical denervation with Botox including the potential for ptosis, usually lasting 1 to 2 months if present, the potential for incomplete effect, and the fact that it lasts about 3 to 4 months on average before wearing off.  We did discuss that Botox injected into the frontalis does cause the eyebrows to descend slightly given paralysis of that muscle.  This does not impact vision but can feel different.  The patient expressed understanding of these risks and wishes to proceed as discussed.    Procedure: Cosmetic chemical denervation for treatment of facial rhytids  Indication: Patient dissatisfaction with facial rhytids  EBL: None  Complications: None  Surgeon Samaria Snell MD  Anesthesia: None  Assistant: None  Specimen: None    Description of procedure:  After informed consent was obtained and documented, the facial area of concern was gently cleansed with 70% isopropyl alcohol.  Using a 30-gauge needle, on the botulinum toxin at a concentration of 5 units per 0.1 mL was locally infiltrated in the below areas.  The patient tolerated the procedure well without known complication.    : 5 units per side for a total of 10 units  Procerus: 5 units    Total units: 15 units

## 2023-08-29 ENCOUNTER — ANESTHESIA EVENT (OUTPATIENT)
Dept: GASTROENTEROLOGY | Facility: HOSPITAL | Age: 48
End: 2023-08-29
Payer: COMMERCIAL

## 2023-08-29 ENCOUNTER — ANESTHESIA (OUTPATIENT)
Dept: GASTROENTEROLOGY | Facility: HOSPITAL | Age: 48
End: 2023-08-29
Payer: COMMERCIAL

## 2023-08-29 ENCOUNTER — HOSPITAL ENCOUNTER (OUTPATIENT)
Facility: HOSPITAL | Age: 48
Setting detail: HOSPITAL OUTPATIENT SURGERY
Discharge: HOME | End: 2023-08-29
Attending: INTERNAL MEDICINE | Admitting: INTERNAL MEDICINE
Payer: COMMERCIAL

## 2023-08-29 VITALS
RESPIRATION RATE: 16 BRPM | WEIGHT: 138 LBS | BODY MASS INDEX: 24.45 KG/M2 | TEMPERATURE: 97.4 F | DIASTOLIC BLOOD PRESSURE: 77 MMHG | HEIGHT: 63 IN | OXYGEN SATURATION: 100 % | SYSTOLIC BLOOD PRESSURE: 120 MMHG | HEART RATE: 72 BPM

## 2023-08-29 DIAGNOSIS — Z12.11 ENCOUNTER FOR SCREENING FOR MALIGNANT NEOPLASM OF COLON: ICD-10-CM

## 2023-08-29 LAB — GLUCOSE BLDC GLUCOMTR-MCNC: 103 MG/DL (ref 70–130)

## 2023-08-29 PROCEDURE — 88305 TISSUE EXAM BY PATHOLOGIST: CPT | Performed by: INTERNAL MEDICINE

## 2023-08-29 PROCEDURE — 45385 COLONOSCOPY W/LESION REMOVAL: CPT | Performed by: INTERNAL MEDICINE

## 2023-08-29 PROCEDURE — 82948 REAGENT STRIP/BLOOD GLUCOSE: CPT

## 2023-08-29 PROCEDURE — 43235 EGD DIAGNOSTIC BRUSH WASH: CPT | Performed by: INTERNAL MEDICINE

## 2023-08-29 PROCEDURE — 43450 DILATE ESOPHAGUS 1/MULT PASS: CPT | Performed by: INTERNAL MEDICINE

## 2023-08-29 PROCEDURE — 25010000002 PROPOFOL 10 MG/ML EMULSION

## 2023-08-29 RX ORDER — LIDOCAINE HYDROCHLORIDE 20 MG/ML
INJECTION, SOLUTION EPIDURAL; INFILTRATION; INTRACAUDAL; PERINEURAL AS NEEDED
Status: DISCONTINUED | OUTPATIENT
Start: 2023-08-29 | End: 2023-08-29 | Stop reason: SURG

## 2023-08-29 RX ORDER — LIDOCAINE HYDROCHLORIDE 10 MG/ML
0.5 INJECTION, SOLUTION EPIDURAL; INFILTRATION; INTRACAUDAL; PERINEURAL ONCE AS NEEDED
Status: DISCONTINUED | OUTPATIENT
Start: 2023-08-29 | End: 2023-08-29 | Stop reason: HOSPADM

## 2023-08-29 RX ORDER — PROPOFOL 10 MG/ML
VIAL (ML) INTRAVENOUS AS NEEDED
Status: DISCONTINUED | OUTPATIENT
Start: 2023-08-29 | End: 2023-08-29 | Stop reason: SURG

## 2023-08-29 RX ORDER — SODIUM CHLORIDE 9 MG/ML
500 INJECTION, SOLUTION INTRAVENOUS CONTINUOUS PRN
Status: DISCONTINUED | OUTPATIENT
Start: 2023-08-29 | End: 2023-08-29 | Stop reason: HOSPADM

## 2023-08-29 RX ORDER — SODIUM CHLORIDE 0.9 % (FLUSH) 0.9 %
10 SYRINGE (ML) INJECTION AS NEEDED
Status: DISCONTINUED | OUTPATIENT
Start: 2023-08-29 | End: 2023-08-29 | Stop reason: HOSPADM

## 2023-08-29 RX ADMIN — PROPOFOL INJECTABLE EMULSION 400 MG: 10 INJECTION, EMULSION INTRAVENOUS at 09:32

## 2023-08-29 RX ADMIN — SODIUM CHLORIDE 500 ML: 9 INJECTION, SOLUTION INTRAVENOUS at 09:01

## 2023-08-29 RX ADMIN — LIDOCAINE HYDROCHLORIDE 100 MG: 20 INJECTION, SOLUTION EPIDURAL; INFILTRATION; INTRACAUDAL; PERINEURAL at 09:32

## 2023-08-29 NOTE — H&P
[facility]  Pre Procedure History & Physical    Chief Complaint:   Dysphagia, screening    Subjective     HPI:   Here for endoscopy and colonoscopy.  Intermittent dysphagia to solids.  Also for screening colonoscopy    Past Medical History:   Past Medical History:   Diagnosis Date    Acid reflux     on Nexium; still moderate despite PPI    Anxiety     Depression     Dysphagia     Elevated cholesterol     on medication    Foot pain     ball of foot     Heartburn     Hemorrhoids     Hypertension     on meds; runs normal at home    IBS (irritable bowel syndrome)     Joint pain     Knee pain     PONV (postoperative nausea and vomiting)     Snoring     SOB (shortness of breath) on exertion     deconditioned    Type 2 diabetes mellitus     A1C was 6.8%    Wears glasses        Past Surgical History:  Past Surgical History:   Procedure Laterality Date    APPENDECTOMY N/A 01/28/2022    Procedure: APPENDECTOMY LAPAROSCOPIC;  Surgeon: Cely Smyth MD;  Location: [facility] OR;  Service: General;  Laterality: N/A;    BLADDER SURGERY      TVT    CHOLECYSTECTOMY  2007    Laparoscopic      ENDOSCOPY  09/23/2014    Distal esophageal ring dilated 50 [place]    ENDOSCOPY N/A 12/30/2016    Esophagus dilated no abnormality found to explain dysphagia, HH repeat prn    KNEE ARTHROSCOPY Right 09/26/2022    Procedure: RIGHT KNEE ARTHROSCOPY WITH DEBRIDEMENT MEDIAL MENISCUS;  Surgeon: Dionte Lopez MD;  Location: [facility] OR;  Service: Orthopedics;  Laterality: Right;    KNEE ARTHROSCOPY W/ DEBRIDEMENT Right        Family History:  Family History   Problem Relation Age of Onset    Colon polyps Father     Hypertension Father     Hypertension Mother     Breast cancer Mother 68        Ductal with 1 node, chemo and radiation    Pancreatic cancer Mother     Renal tubular acidosis Mother     Hypertension Sister     Hypertension Sister     Heart disease Paternal Grandfather     Cancer Maternal Grandmother     Breast  cancer Maternal Grandmother 30    Melanoma Maternal Uncle     Colon cancer Neg Hx     Ovarian cancer Neg Hx     Uterine cancer Neg Hx     Prostate cancer Neg Hx        Social History:   reports that she quit smoking about 11 years ago. Her smoking use included cigarettes. She has a 1.50 pack-year smoking history. She has never used smokeless tobacco. She reports current alcohol use. She reports that she does not use drugs.    Medications:   Prior to Admission medications    Medication Sig Start Date End Date Taking? Authorizing Provider   ALPRAZolam (XANAX) 0.25 MG tablet Take 1 tablet by mouth Daily As Needed.   Yes Ronan Samaniego MD   BuPROPion HCl (WELLBUTRIN PO) Take 300 mg by mouth Daily.   Yes Ronan Samaniego MD   Cholecalciferol (VITAMIN D3 PO) Take 25 mcg by mouth Daily.   Yes Ronan Samaniego MD   esomeprazole (nexIUM) 40 MG capsule Take 1 capsule by mouth Every Morning Before Breakfast.   Yes Ronan Samaniego MD   estradiol (ESTRACE) 1 MG tablet Take 1.5 tablets by mouth Daily. 10/25/22  Yes Yun Rain APRN   fenofibrate (TRICOR) 145 MG tablet Take 1 tablet by mouth Daily.   Yes Ronan Samaniego MD   FLUoxetine (PROzac) 20 MG capsule Take 1 capsule by mouth Daily.   Yes Ronan Samaniego MD   losartan-hydrochlorothiazide (HYZAAR) 100-25 MG per tablet Take 1 tablet by mouth Daily.   Yes Ronan Samaniego MD   medroxyPROGESTERone (PROVERA) 2.5 MG tablet Take 1 tablet by mouth Daily. 10/25/22  Yes Yun Rain APRN   rosuvastatin (CRESTOR) 10 MG tablet Take 1 tablet by mouth Daily.   Yes Provider, Historical, MD   Mounjaro 10 MG/0.5ML solution pen-injector Inject 0.75 mL as directed Every 7 (Seven) Days. 2/28/23   Ronan Samaniego MD   sodium-potassium-magnesium sulfates (Suprep Bowel Prep Kit) 17.5-3.13-1.6 GM/177ML solution oral solution Take as directed by office instructions provided 6/19/23   Raquel Wright APRN       Allergies:  Patient has no known  "allergies.    Objective     Blood pressure 132/83, pulse 91, temperature 97.4 °F (36.3 °C), temperature source Temporal, resp. rate 18, height 160 cm (63\"), weight 62.6 kg (138 lb), last menstrual period 07/19/2020, SpO2 100 %, not currently breastfeeding.    Physical Exam   Constitutional: Pt is oriented to person, place, and in no distress.   HENT: Mouth/Throat: Oropharynx is clear.   Cardiovascular: Normal rate, regular rhythm.    Pulmonary/Chest: Effort normal. No respiratory distress. No  wheezes.   Abdominal: Soft. Non-distended.  Skin: Skin is warm and dry.   Psychiatric: Mood, memory, affect and judgment appear normal.     Assessment & Plan     Diagnosis:  Dysphagia, screening colonoscopy    Anticipated Surgical Procedure:    Proceed with endoscopy and colonoscopy as scheduled    The following major R/B/A were discussed with the patient, however the list is not all inclusive . Risk:  Bleeding (immediate and delayed), perforation (rupture or tear), reaction to medication, missed lesion/cancer, pain during the procedure, infection, need for surgery, need for ostomy, need for mechanical ventilation (breathing machine), death.  Benefits: removal of polyp/tissue, burn/clip/or inject to stop bleeding, removal of foreign body, dilate any stricture.  Alternatives: Xray or CT, surgery, do nothing with associated risk   The patient was given time to ask question and received explanation, and agrees to proceed as per History and Physical.   No guarantee given or expressed.    EMR Dragon/transcription disclaimer: Much of this encounter note is an electronic transcription/translation of spoken language to printed text.  The electronic translation of spoken language may permit erroneous, or at times, nonsensical words or phrases to be inadvertently transcribed.  Although I have reviewed the note for such errors, some may still exist.    Maged Rios MD  09:19 CDT  8/29/2023    "

## 2023-08-29 NOTE — ANESTHESIA PREPROCEDURE EVALUATION
Anesthesia Evaluation     history of anesthetic complications:  PONV  NPO Solid Status: > 8 hours  NPO Liquid Status: > 2 hours           Airway   Mallampati: I  TM distance: >3 FB  Neck ROM: full  No difficulty expected  Dental      Pulmonary    (+) a smoker (quit 2012) Former,  Cardiovascular   Exercise tolerance: excellent (>7 METS)    (+) hypertension, hyperlipidemia  (-) CAD      Neuro/Psych  (-) seizures, TIA, CVA  GI/Hepatic/Renal/Endo    (+) GERD, diabetes mellitus type 2  (-) liver disease, no renal disease    Musculoskeletal     Abdominal    Substance History      OB/GYN          Other                        Anesthesia Plan    ASA 2     MAC     intravenous induction     Anesthetic plan, risks, benefits, and alternatives have been provided, discussed and informed consent has been obtained with: patient.    CODE STATUS:

## 2023-08-29 NOTE — ANESTHESIA POSTPROCEDURE EVALUATION
Patient: Ellie Gross    Procedure Summary       Date: 08/29/23 Room / Location: [facility] ENDOSCOPY 4 / [facility]    Anesthesia Start: 0930 Anesthesia Stop: 0958    Procedures:       ESOPHAGOGASTRODUODENOSCOPY WITH ANESTHESIA      COLONOSCOPY WITH ANESTHESIA Diagnosis:       Encounter for screening for malignant neoplasm of colon      (Encounter for screening for malignant neoplasm of colon [Z12.11])    Surgeons: Maged Rios MD Provider: Omid Dennison CRNA    Anesthesia Type: MAC ASA Status: 2            Anesthesia Type: MAC    Vitals  No vitals data found for the desired time range.          Post Anesthesia Care and Evaluation    Patient location during evaluation: PHASE II  Patient participation: complete - patient participated  Level of consciousness: awake and alert  Pain score: 0    Airway patency: patent  Anesthetic complications: No anesthetic complications  PONV Status: none  Cardiovascular status: acceptable  Respiratory status: acceptable  Hydration status: acceptable  No anesthesia care post op

## 2023-08-30 LAB
CYTO UR: NORMAL
LAB AP CASE REPORT: NORMAL
Lab: NORMAL
PATH REPORT.FINAL DX SPEC: NORMAL
PATH REPORT.GROSS SPEC: NORMAL

## 2023-11-02 ENCOUNTER — OFFICE VISIT (OUTPATIENT)
Dept: OBSTETRICS AND GYNECOLOGY | Facility: CLINIC | Age: 48
End: 2023-11-02
Payer: COMMERCIAL

## 2023-11-02 VITALS
SYSTOLIC BLOOD PRESSURE: 110 MMHG | DIASTOLIC BLOOD PRESSURE: 72 MMHG | BODY MASS INDEX: 21.62 KG/M2 | HEIGHT: 63 IN | WEIGHT: 122 LBS

## 2023-11-02 DIAGNOSIS — N95.1 MENOPAUSAL SYMPTOMS: ICD-10-CM

## 2023-11-02 DIAGNOSIS — Z12.31 ENCOUNTER FOR SCREENING MAMMOGRAM FOR BREAST CANCER: ICD-10-CM

## 2023-11-02 DIAGNOSIS — Z80.3 FAMILY HISTORY OF BREAST CANCER: ICD-10-CM

## 2023-11-02 DIAGNOSIS — Z01.419 WELL WOMAN EXAM WITH ROUTINE GYNECOLOGICAL EXAM: Primary | ICD-10-CM

## 2023-11-02 DIAGNOSIS — E55.9 VITAMIN D DEFICIENCY: ICD-10-CM

## 2023-11-02 DIAGNOSIS — N89.8 VAGINAL DISCHARGE: ICD-10-CM

## 2023-11-02 PROCEDURE — 87512 GARDNER VAG DNA QUANT: CPT | Performed by: NURSE PRACTITIONER

## 2023-11-02 PROCEDURE — 87481 CANDIDA DNA AMP PROBE: CPT | Performed by: NURSE PRACTITIONER

## 2023-11-02 PROCEDURE — 87563 M. GENITALIUM AMP PROBE: CPT | Performed by: NURSE PRACTITIONER

## 2023-11-02 PROCEDURE — 87661 TRICHOMONAS VAGINALIS AMPLIF: CPT | Performed by: NURSE PRACTITIONER

## 2023-11-02 PROCEDURE — 87624 HPV HI-RISK TYP POOLED RSLT: CPT | Performed by: NURSE PRACTITIONER

## 2023-11-02 PROCEDURE — 87798 DETECT AGENT NOS DNA AMP: CPT | Performed by: NURSE PRACTITIONER

## 2023-11-02 PROCEDURE — G0123 SCREEN CERV/VAG THIN LAYER: HCPCS | Performed by: NURSE PRACTITIONER

## 2023-11-02 RX ORDER — FLUCONAZOLE 150 MG/1
150 TABLET ORAL ONCE
Qty: 2 TABLET | Refills: 0 | Status: SHIPPED | OUTPATIENT
Start: 2023-11-02 | End: 2023-11-02

## 2023-11-02 RX ORDER — MEDROXYPROGESTERONE ACETATE 2.5 MG/1
2.5 TABLET ORAL DAILY
Qty: 30 TABLET | Refills: 12 | Status: SHIPPED | OUTPATIENT
Start: 2023-11-02

## 2023-11-02 RX ORDER — ESTRADIOL 1 MG/1
1.5 TABLET ORAL DAILY
Qty: 45 TABLET | Refills: 12 | Status: SHIPPED | OUTPATIENT
Start: 2023-11-02

## 2023-11-02 NOTE — PROGRESS NOTES
"Subjective     Ellie Gross is a 48 y.o. female    History of Present Illness  She is here today for yearly checkup.  She has complaints of possible yeast infection.  States she is doing much better on 1-1/2 mg of Estrace.  She denies any bleeding or spotting.  Gynecologic Exam  The patient's primary symptoms include vaginal discharge. The patient's pertinent negatives include no pelvic pain or vaginal bleeding. The patient is experiencing no pain. Pertinent negatives include no abdominal pain, anorexia, back pain, chills, constipation, diarrhea, discolored urine, dysuria, fever, flank pain, frequency, headaches, hematuria, joint pain, joint swelling, nausea, painful intercourse, rash, sore throat, urgency or vomiting. The vaginal discharge was white. There has been no bleeding. She has not been passing clots. She has not been passing tissue. She is sexually active. She is postmenopausal.         /72   Ht 160 cm (62.99\")   Wt 55.3 kg (122 lb)   LMP 07/19/2020 (Exact Date) Comment: last menstrual cycle was \"a little over a year ago\"  BMI 21.62 kg/m²     Outpatient Encounter Medications as of 11/2/2023   Medication Sig Dispense Refill    ALPRAZolam (XANAX) 0.25 MG tablet Take 1 tablet by mouth Daily As Needed.      BuPROPion HCl (WELLBUTRIN PO) Take 300 mg by mouth Daily.      Cholecalciferol (VITAMIN D3 PO) Take 25 mcg by mouth Daily.      esomeprazole (nexIUM) 40 MG capsule Take 1 capsule by mouth Every Morning Before Breakfast.      estradiol (ESTRACE) 1 MG tablet Take 1.5 tablets by mouth Daily. 45 tablet 12    fenofibrate (TRICOR) 145 MG tablet Take 1 tablet by mouth Daily.      FLUoxetine (PROzac) 20 MG capsule Take 1 capsule by mouth Daily.      losartan-hydrochlorothiazide (HYZAAR) 100-25 MG per tablet Take 1 tablet by mouth Daily.      medroxyPROGESTERone (PROVERA) 2.5 MG tablet Take 1 tablet by mouth Daily. 30 tablet 12    Mounjaro 10 MG/0.5ML solution pen-injector Inject 0.75 mL as directed " Every 7 (Seven) Days.      rosuvastatin (CRESTOR) 10 MG tablet Take 1 tablet by mouth Daily.      [DISCONTINUED] estradiol (ESTRACE) 1 MG tablet Take 1.5 tablets by mouth Daily. 45 tablet 12    [DISCONTINUED] medroxyPROGESTERone (PROVERA) 2.5 MG tablet Take 1 tablet by mouth Daily. 30 tablet 12    fluconazole (Diflucan) 150 MG tablet Take 1 tablet by mouth 1 (One) Time for 1 dose. Take once a week for 2 weeks. 2 tablet 0    [DISCONTINUED] sodium-potassium-magnesium sulfates (Suprep Bowel Prep Kit) 17.5-3.13-1.6 GM/177ML solution oral solution Take as directed by office instructions provided 177 mL 0     No facility-administered encounter medications on file as of 11/2/2023.       Past Medical History  Past Medical History:   Diagnosis Date    Acid reflux     Anxiety     Depression     Dysphagia     Elevated cholesterol     Foot pain     Heartburn     Hemorrhoids     Hypertension     IBS (irritable bowel syndrome)     Joint pain     Knee pain     PONV (postoperative nausea and vomiting)     Snoring     SOB (shortness of breath) on exertion     Type 2 diabetes mellitus     Wears glasses        Surgical History  Past Surgical History:   Procedure Laterality Date    APPENDECTOMY N/A 01/28/2022    Procedure: APPENDECTOMY LAPAROSCOPIC;  Surgeon: Cely Smyth MD;  Location: [facility] OR;  Service: General;  Laterality: N/A;    BLADDER SURGERY      TVT    CHOLECYSTECTOMY  2007    Laparoscopic      COLONOSCOPY N/A 8/29/2023    Procedure: COLONOSCOPY WITH ANESTHESIA;  Surgeon: Maged Rios MD;  Location: [facility] ENDOSCOPY;  Service: Gastroenterology;  Laterality: N/A;  pre screen  post polyps   dr brian pimentel    ENDOSCOPY  09/23/2014    Distal esophageal ring dilated 50 [place]    ENDOSCOPY N/A 12/30/2016    Esophagus dilated no abnormality found to explain dysphagia, HH repeat prn    ENDOSCOPY N/A 8/29/2023    Procedure: ESOPHAGOGASTRODUODENOSCOPY WITH ANESTHESIA;  Surgeon: Maged Rios MD;  Location: [facility]  ENDOSCOPY;  Service: Gastroenterology;  Laterality: N/A;  pre dysphagia  post hiatal hernia;   dr brian pimentel    KNEE ARTHROSCOPY Right 09/26/2022    Procedure: RIGHT KNEE ARTHROSCOPY WITH DEBRIDEMENT MEDIAL MENISCUS;  Surgeon: Dionte Lopez MD;  Location: [facility];  Service: Orthopedics;  Laterality: Right;    KNEE ARTHROSCOPY W/ DEBRIDEMENT Right        Family History  Family History   Problem Relation Age of Onset    Colon polyps Father     Hypertension Father     Hypertension Mother     Breast cancer Mother 68        Ductal with 1 node, chemo and radiation    Pancreatic cancer Mother     Renal tubular acidosis Mother     Hypertension Sister     Hypertension Sister     Heart disease Paternal Grandfather     Cancer Maternal Grandmother     Breast cancer Maternal Grandmother 30    Melanoma Maternal Uncle     Colon cancer Neg Hx     Ovarian cancer Neg Hx     Uterine cancer Neg Hx     Prostate cancer Neg Hx        The following portions of the patient's history were reviewed and updated as appropriate: allergies, current medications, past family history, past medical history, past social history, past surgical history, and problem list.    Review of Systems   Constitutional:  Negative for activity change, appetite change, chills, diaphoresis, fatigue, fever, unexpected weight gain and unexpected weight loss.   HENT:  Negative for congestion, dental problem, drooling, ear discharge, ear pain, facial swelling, hearing loss, mouth sores, nosebleeds, postnasal drip, rhinorrhea, sinus pressure, sneezing, sore throat, swollen glands, tinnitus, trouble swallowing and voice change.    Eyes:  Negative for blurred vision, double vision, photophobia, pain, discharge, redness, itching and visual disturbance.   Respiratory:  Negative for apnea, cough, choking, chest tightness, shortness of breath, wheezing and stridor.    Cardiovascular:  Negative for chest pain, palpitations and leg swelling.   Gastrointestinal:   Negative for abdominal distention, abdominal pain, anal bleeding, anorexia, blood in stool, constipation, diarrhea, nausea, rectal pain, vomiting, GERD and indigestion.   Endocrine: Negative for cold intolerance, heat intolerance, polydipsia, polyphagia and polyuria.   Genitourinary:  Positive for vaginal discharge. Negative for amenorrhea, breast discharge, breast lump, breast pain, decreased libido, decreased urine volume, difficulty urinating, dyspareunia, dysuria, flank pain, frequency, genital sores, hematuria, menstrual problem, pelvic pain, pelvic pressure, urgency, urinary incontinence, vaginal bleeding and vaginal pain.   Musculoskeletal:  Negative for arthralgias, back pain, gait problem, joint pain, joint swelling, myalgias, neck pain, neck stiffness and bursitis.   Skin:  Negative for color change, dry skin and rash.   Allergic/Immunologic: Negative for environmental allergies, food allergies and immunocompromised state.   Neurological:  Negative for dizziness, tremors, seizures, syncope, facial asymmetry, speech difficulty, weakness, light-headedness, numbness, headache, memory problem and confusion.   Hematological:  Negative for adenopathy. Does not bruise/bleed easily.   Psychiatric/Behavioral:  Negative for agitation, behavioral problems, decreased concentration, dysphoric mood, hallucinations, self-injury, sleep disturbance, suicidal ideas, negative for hyperactivity, depressed mood and stress. The patient is not nervous/anxious.        Objective   Physical Exam  Vitals and nursing note reviewed. Exam conducted with a chaperone present.   Constitutional:       General: She is not in acute distress.     Appearance: She is well-developed. She is not diaphoretic.   HENT:      Head: Normocephalic.      Right Ear: External ear normal.      Left Ear: External ear normal.      Nose: Nose normal.   Eyes:      General: No scleral icterus.        Right eye: No discharge.         Left eye: No discharge.       Conjunctiva/sclera: Conjunctivae normal.      Pupils: Pupils are equal, round, and reactive to light.   Neck:      Thyroid: No thyromegaly.      Vascular: No carotid bruit.      Trachea: No tracheal deviation.   Cardiovascular:      Rate and Rhythm: Normal rate and regular rhythm.      Heart sounds: Normal heart sounds. No murmur heard.  Pulmonary:      Effort: Pulmonary effort is normal. No respiratory distress.      Breath sounds: Normal breath sounds. No wheezing.   Chest:   Breasts:     Breasts are symmetrical.      Right: Normal. No swelling, bleeding, inverted nipple, mass, nipple discharge, skin change or tenderness.      Left: Normal. No swelling, bleeding, inverted nipple, mass, nipple discharge, skin change or tenderness.   Abdominal:      General: There is no distension.      Palpations: Abdomen is soft. There is no mass.      Tenderness: There is no abdominal tenderness. There is no right CVA tenderness, left CVA tenderness or guarding.      Hernia: No hernia is present. There is no hernia in the left inguinal area or right inguinal area.   Genitourinary:     General: Normal vulva.      Exam position: Lithotomy position.      Labia:         Right: No rash, tenderness, lesion or injury.         Left: No rash, tenderness, lesion or injury.       Vagina: Normal. No signs of injury and foreign body. Vaginal discharge present. No erythema, tenderness or bleeding.      Cervix: Normal.      Uterus: Normal. Not enlarged, not fixed and not tender.       Adnexa: Right adnexa normal and left adnexa normal.        Right: No mass, tenderness or fullness.          Left: No mass, tenderness or fullness.        Rectum: Normal. No mass.      Comments:   BSU normal  Urethral meatus  Normal  Perineum  Normal  Musculoskeletal:         General: No tenderness. Normal range of motion.      Cervical back: Normal range of motion and neck supple.   Lymphadenopathy:      Head:      Right side of head: No submental, submandibular,  tonsillar, preauricular, posterior auricular or occipital adenopathy.      Left side of head: No submental, submandibular, tonsillar, preauricular, posterior auricular or occipital adenopathy.      Cervical: No cervical adenopathy.      Right cervical: No superficial, deep or posterior cervical adenopathy.     Left cervical: No superficial, deep or posterior cervical adenopathy.      Upper Body:      Right upper body: No supraclavicular, axillary or pectoral adenopathy.      Left upper body: No supraclavicular, axillary or pectoral adenopathy.      Lower Body: No right inguinal adenopathy. No left inguinal adenopathy.   Skin:     General: Skin is warm and dry.      Findings: No bruising, erythema or rash.   Neurological:      Mental Status: She is alert and oriented to person, place, and time.      Coordination: Coordination normal.   Psychiatric:         Mood and Affect: Mood normal.         Behavior: Behavior normal.         Thought Content: Thought content normal.         Judgment: Judgment normal.       PHQ-9 Depression Screening  Little interest or pleasure in doing things? 0-->not at all   Feeling down, depressed, or hopeless? 0-->not at all   Trouble falling or staying asleep, or sleeping too much?     Feeling tired or having little energy?     Poor appetite or overeating?     Feeling bad about yourself - or that you are a failure or have let yourself or your family down?     Trouble concentrating on things, such as reading the newspaper or watching television?     Moving or speaking so slowly that other people could have noticed? Or the opposite - being so fidgety or restless that you have been moving around a lot more than usual?     Thoughts that you would be better off dead, or of hurting yourself in some way?     PHQ-9 Total Score 0   If you checked off any problems, how difficult have these problems made it for you to do your work, take care of things at home, or get along with other people?           Assessment & Plan   Diagnoses and all orders for this visit:    1. Well woman exam with routine gynecological exam (Primary)  Normal GYN exam. Will have lab work at PCP.  encouraged SBE, pt is aware how to do self breast exam and the importance of same. Discussed weight management and importance of maintaining a healthy weight. Discussed Vitamin D intake and the importance of adequate vitamin D for both Bone Health and a healthy immune system.  Discussed Daily exercise and the importance of same, in regards to a healthy heart as well as helping to maintain her weight and improving her mental health.  BMI 21.6.  Colonoscopy is up to date.  Mammogram will be scheduled at [facility].  Pap smear is done due to patient's request.  We discussed ASCCP guidelines.  After informed decision patient wishes to proceed with the Pap smear.         -     Liquid-based Pap Smear, Screening    2. Encounter for screening mammogram for breast cancer  Comments:  Patient will have a mammogram at [facility].  Orders:  -     Mammo Screening Digital Tomosynthesis Bilateral With CAD; Future    3. Menopausal symptoms  Comments:  Patient is doing well with 1-1/2 mg of estrogen and progesterone.  She is given refills of same.  She is instructed to call with any bleeding or spotting.  Orders:  -     estradiol (ESTRACE) 1 MG tablet; Take 1.5 tablets by mouth Daily.  Dispense: 45 tablet; Refill: 12  -     medroxyPROGESTERone (PROVERA) 2.5 MG tablet; Take 1 tablet by mouth Daily.  Dispense: 30 tablet; Refill: 12    4. Family history of breast cancer  Comments:  Patient has family history of breast cancer.  She has had negative genetic screening.    5. Vitamin D deficiency  Comments:  Patient will have labs drawn.    6. Vaginal discharge  Comments:  Patient has a moderate amount of yellow discharge.  BV panel is sent to lab.  She is given Diflucan today.  Orders:  -     Liquid-based Pap Smear, Screening  -     fluconazole  (Diflucan) 150 MG tablet; Take 1 tablet by mouth 1 (One) Time for 1 dose. Take once a week for 2 weeks.  Dispense: 2 tablet; Refill: 0         BMI is within normal parameters. No other follow-up for BMI required.      Yun Rain, APRN  11/2/2023

## 2023-11-09 LAB
GEN CATEG CVX/VAG CYTO-IMP: NORMAL
HPV I/H RISK 4 DNA CVX QL PROBE+SIG AMP: NOT DETECTED
LAB AP CASE REPORT: NORMAL
LAB AP GYN ADDITIONAL INFORMATION: NORMAL
LAB AP GYN OTHER FINDINGS: NORMAL
Lab: NORMAL
PATH INTERP SPEC-IMP: NORMAL
STAT OF ADQ CVX/VAG CYTO-IMP: NORMAL
TRICHOMONAS VAGINALIS PCR: NOT DETECTED

## 2023-11-09 RX ORDER — METRONIDAZOLE 500 MG/1
500 TABLET ORAL 2 TIMES DAILY
Qty: 14 TABLET | Refills: 0 | Status: SHIPPED | OUTPATIENT
Start: 2023-11-09 | End: 2023-11-16

## 2023-12-04 LAB
NCCN CRITERIA FLAG: ABNORMAL
TYRER CUZICK SCORE: 17.7

## 2023-12-07 ENCOUNTER — HOSPITAL ENCOUNTER (OUTPATIENT)
Dept: MAMMOGRAPHY | Facility: HOSPITAL | Age: 48
Discharge: HOME | End: 2023-12-07
Admitting: NURSE PRACTITIONER
Payer: COMMERCIAL

## 2023-12-07 DIAGNOSIS — Z12.31 ENCOUNTER FOR SCREENING MAMMOGRAM FOR BREAST CANCER: ICD-10-CM

## 2023-12-07 DIAGNOSIS — R92.8 ABNORMAL MAMMOGRAM: Primary | ICD-10-CM

## 2023-12-07 PROCEDURE — 77067 SCR MAMMO BI INCL CAD: CPT

## 2023-12-07 PROCEDURE — 77063 BREAST TOMOSYNTHESIS BI: CPT

## 2023-12-14 ENCOUNTER — HOSPITAL ENCOUNTER (OUTPATIENT)
Dept: MAMMOGRAPHY | Facility: HOSPITAL | Age: 48
Discharge: HOME | End: 2023-12-14
Payer: COMMERCIAL

## 2023-12-14 ENCOUNTER — HOSPITAL ENCOUNTER (OUTPATIENT)
Dept: ULTRASOUND IMAGING | Facility: HOSPITAL | Age: 48
Discharge: HOME | End: 2023-12-14
Payer: COMMERCIAL

## 2023-12-14 DIAGNOSIS — R92.8 ABNORMAL MAMMOGRAM: ICD-10-CM

## 2023-12-14 PROCEDURE — 77065 DX MAMMO INCL CAD UNI: CPT

## 2023-12-14 PROCEDURE — G0279 TOMOSYNTHESIS, MAMMO: HCPCS

## 2024-11-12 DIAGNOSIS — N95.1 MENOPAUSAL SYMPTOMS: ICD-10-CM

## 2024-11-12 RX ORDER — ESTRADIOL 1 MG/1
1.5 TABLET ORAL DAILY
Qty: 45 TABLET | Refills: 1 | Status: SHIPPED | OUTPATIENT
Start: 2024-11-12

## 2024-11-12 RX ORDER — MEDROXYPROGESTERONE ACETATE 2.5 MG/1
2.5 TABLET ORAL DAILY
Qty: 30 TABLET | Refills: 1 | Status: SHIPPED | OUTPATIENT
Start: 2024-11-12

## 2024-11-12 NOTE — TELEPHONE ENCOUNTER
Fax refill request from pharmacy. Patient has an appt in December. Two month supply sent in to get patient to appt on 12/31.

## 2024-12-27 ENCOUNTER — TRANSCRIBE ORDERS (OUTPATIENT)
Dept: ADMINISTRATIVE | Facility: HOSPITAL | Age: 49
End: 2024-12-27
Payer: COMMERCIAL

## 2024-12-27 DIAGNOSIS — Z12.31 OTHER SCREENING MAMMOGRAM: Primary | ICD-10-CM

## 2024-12-31 ENCOUNTER — HOSPITAL ENCOUNTER (OUTPATIENT)
Dept: MAMMOGRAPHY | Facility: HOSPITAL | Age: 49
Discharge: HOME | End: 2024-12-31
Admitting: NURSE PRACTITIONER
Payer: COMMERCIAL

## 2024-12-31 DIAGNOSIS — Z12.31 OTHER SCREENING MAMMOGRAM: ICD-10-CM

## 2024-12-31 PROCEDURE — 77067 SCR MAMMO BI INCL CAD: CPT

## 2024-12-31 PROCEDURE — 77063 BREAST TOMOSYNTHESIS BI: CPT

## 2025-01-09 ENCOUNTER — HOSPITAL ENCOUNTER (OUTPATIENT)
Dept: MAMMOGRAPHY | Facility: HOSPITAL | Age: 50
Discharge: HOME | End: 2025-01-09
Payer: COMMERCIAL

## 2025-01-09 ENCOUNTER — HOSPITAL ENCOUNTER (OUTPATIENT)
Dept: ULTRASOUND IMAGING | Facility: HOSPITAL | Age: 50
Discharge: HOME | End: 2025-01-09
Payer: COMMERCIAL

## 2025-01-09 DIAGNOSIS — R92.8 ABNORMAL MAMMOGRAM: ICD-10-CM

## 2025-01-09 DIAGNOSIS — N95.1 MENOPAUSAL SYMPTOMS: ICD-10-CM

## 2025-01-09 LAB — CREAT BLDA-MCNC: 0.9 MG/DL (ref 0.6–1.3)

## 2025-01-09 PROCEDURE — 77066 DX MAMMO INCL CAD BI: CPT

## 2025-01-09 PROCEDURE — G0279 TOMOSYNTHESIS, MAMMO: HCPCS

## 2025-01-09 PROCEDURE — 25510000001 IOPAMIDOL PER 1 ML: Performed by: NURSE PRACTITIONER

## 2025-01-09 PROCEDURE — 82565 ASSAY OF CREATININE: CPT

## 2025-01-09 PROCEDURE — 76642 ULTRASOUND BREAST LIMITED: CPT

## 2025-01-09 RX ORDER — MEDROXYPROGESTERONE ACETATE 2.5 MG/1
2.5 TABLET ORAL DAILY
Qty: 30 TABLET | Refills: 0 | Status: SHIPPED | OUTPATIENT
Start: 2025-01-09

## 2025-01-09 RX ORDER — IOPAMIDOL 755 MG/ML
100 INJECTION, SOLUTION INTRAVASCULAR
Status: COMPLETED | OUTPATIENT
Start: 2025-01-09 | End: 2025-01-09

## 2025-01-09 RX ORDER — ESTRADIOL 1 MG/1
1.5 TABLET ORAL DAILY
Qty: 45 TABLET | Refills: 0 | Status: SHIPPED | OUTPATIENT
Start: 2025-01-09

## 2025-01-09 RX ADMIN — IOPAMIDOL 100 ML: 755 INJECTION, SOLUTION INTRAVENOUS at 10:20

## 2025-02-11 DIAGNOSIS — N95.1 MENOPAUSAL SYMPTOMS: ICD-10-CM

## 2025-02-11 RX ORDER — MEDROXYPROGESTERONE ACETATE 2.5 MG/1
2.5 TABLET ORAL DAILY
Qty: 30 TABLET | Refills: 0 | Status: SHIPPED | OUTPATIENT
Start: 2025-02-11 | End: 2025-02-14 | Stop reason: SDUPTHER

## 2025-02-11 RX ORDER — ESTRADIOL 1 MG/1
1.5 TABLET ORAL DAILY
Qty: 45 TABLET | Refills: 0 | Status: SHIPPED | OUTPATIENT
Start: 2025-02-11 | End: 2025-02-14 | Stop reason: SDUPTHER

## 2025-02-14 ENCOUNTER — OFFICE VISIT (OUTPATIENT)
Dept: OBSTETRICS AND GYNECOLOGY | Age: 50
End: 2025-02-14
Payer: COMMERCIAL

## 2025-02-14 VITALS
WEIGHT: 115 LBS | DIASTOLIC BLOOD PRESSURE: 68 MMHG | SYSTOLIC BLOOD PRESSURE: 112 MMHG | BODY MASS INDEX: 20.38 KG/M2 | HEIGHT: 63 IN

## 2025-02-14 DIAGNOSIS — N95.1 MENOPAUSAL SYMPTOMS: ICD-10-CM

## 2025-02-14 DIAGNOSIS — Z12.31 ENCOUNTER FOR SCREENING MAMMOGRAM FOR MALIGNANT NEOPLASM OF BREAST: ICD-10-CM

## 2025-02-14 DIAGNOSIS — Z01.419 WELL WOMAN EXAM WITH ROUTINE GYNECOLOGICAL EXAM: Primary | ICD-10-CM

## 2025-02-14 PROCEDURE — G0123 SCREEN CERV/VAG THIN LAYER: HCPCS | Performed by: NURSE PRACTITIONER

## 2025-02-14 PROCEDURE — 87624 HPV HI-RISK TYP POOLED RSLT: CPT | Performed by: NURSE PRACTITIONER

## 2025-02-14 RX ORDER — CLOBETASOL PROPIONATE 0.5 MG/G
OINTMENT TOPICAL
COMMUNITY

## 2025-02-14 RX ORDER — ESTRADIOL 1 MG/1
1.5 TABLET ORAL DAILY
Qty: 45 TABLET | Refills: 11 | Status: SHIPPED | OUTPATIENT
Start: 2025-02-14

## 2025-02-14 RX ORDER — ROSUVASTATIN CALCIUM 20 MG/1
1 TABLET, COATED ORAL DAILY
COMMUNITY

## 2025-02-14 RX ORDER — BUPROPION HYDROCHLORIDE 300 MG/1
TABLET ORAL EVERY 24 HOURS
COMMUNITY

## 2025-02-14 RX ORDER — DESVENLAFAXINE 25 MG/1
TABLET, EXTENDED RELEASE ORAL
COMMUNITY
Start: 2025-02-13

## 2025-02-14 RX ORDER — TIRZEPATIDE 7.5 MG/.5ML
INJECTION, SOLUTION SUBCUTANEOUS
COMMUNITY
Start: 2025-02-11

## 2025-02-14 RX ORDER — ESZOPICLONE 3 MG/1
TABLET, FILM COATED ORAL
COMMUNITY

## 2025-02-14 RX ORDER — MEDROXYPROGESTERONE ACETATE 2.5 MG/1
2.5 TABLET ORAL DAILY
Qty: 30 TABLET | Refills: 11 | Status: SHIPPED | OUTPATIENT
Start: 2025-02-14

## 2025-02-14 RX ORDER — ALPRAZOLAM 0.5 MG
TABLET ORAL
COMMUNITY

## 2025-02-14 NOTE — PROGRESS NOTES
Chief Complaint   Patient presents with    Gynecologic Exam     Patient is here for annual well GYN Exam. Last well GYN exam and pap 11/2/23, normal/-HPV. Pt amenorrhea since age 45. Last contrast mammo 1/9/25 with left breast US, BIRADS Cat 2 benign. Pt c/o occasional MEAGAN. Patient denies current pelvic pain, abnormal vaginal bleeding or discharge, dyspareunia, and voices no other complaints.        History:  Ellie Gross is a 49 y.o. female who presents today for evaluation of the above problems.      Ellie Gross is a 49 y.o. female here today for annual GYN examination.   She is postmenopausal.   Her last Pap smear was 2023, and was normal.   Last Mammogram  Declines STD screening   HRT-doing well on current therapy  Her medical history is reviewed.           ROS:  Review of Systems   Constitutional: Negative.    HENT: Negative.     Eyes: Negative.    Respiratory: Negative.     Cardiovascular: Negative.    Gastrointestinal: Negative.    Endocrine: Negative.    Genitourinary:  Positive for urinary incontinence.   Musculoskeletal: Negative.    Skin: Negative.    Neurological: Negative.    Psychiatric/Behavioral: Negative.         No Known Allergies  Past Medical History:   Diagnosis Date    Acid reflux     on Nexium; still moderate despite PPI    Anxiety     Depression     Dysphagia     Elevated cholesterol     on medication    Foot pain     ball of foot     Heartburn     Hemorrhoids     Hypertension     on meds; runs normal at home    IBS (irritable bowel syndrome)     Joint pain     Knee pain     PONV (postoperative nausea and vomiting)     Snoring     SOB (shortness of breath) on exertion     deconditioned    Type 2 diabetes mellitus     A1C was 6.8%    Wears glasses      Past Surgical History:   Procedure Laterality Date    APPENDECTOMY N/A 01/28/2022    Procedure: APPENDECTOMY LAPAROSCOPIC;  Surgeon: Cely Smyth MD;  Location: [facility] OR;  Service: General;  Laterality: N/A;    BLADDER  SURGERY      TVT    CHOLECYSTECTOMY  2007    Laparoscopic      COLONOSCOPY N/A 08/29/2023    Procedure: COLONOSCOPY WITH ANESTHESIA;  Surgeon: Maged Rios MD;  Location:  [facility] ENDOSCOPY;  Service: Gastroenterology;  Laterality: N/A;  pre screen  post polyps   dr brian pimentel    ENDOSCOPY  09/23/2014    Distal esophageal ring dilated 50 [place]    ENDOSCOPY N/A 12/30/2016    Esophagus dilated no abnormality found to explain dysphagia, HH repeat prn    ENDOSCOPY N/A 08/29/2023    Procedure: ESOPHAGOGASTRODUODENOSCOPY WITH ANESTHESIA;  Surgeon: Maged Rios MD;  Location:  [facility] ENDOSCOPY;  Service: Gastroenterology;  Laterality: N/A;  pre dysphagia  post hiatal hernia;   dr brian pimentel    KNEE ARTHROSCOPY Right 09/26/2022    Procedure: RIGHT KNEE ARTHROSCOPY WITH DEBRIDEMENT MEDIAL MENISCUS;  Surgeon: Dionte Lopez MD;  Location: [facility] OR;  Service: Orthopedics;  Laterality: Right;    KNEE ARTHROSCOPY W/ DEBRIDEMENT Right     LAPAROSCOPIC CHOLECYSTECTOMY  2007     Family History   Problem Relation Age of Onset    Colon polyps Father     Hypertension Father     Hypertension Mother     Breast cancer Mother 68        Ductal with 1 node, chemo and radiation    Pancreatic cancer Mother     Renal tubular acidosis Mother     Hypertension Sister     Hypertension Sister     Heart disease Paternal Grandfather     Cancer Maternal Grandmother     Breast cancer Maternal Grandmother 30    Melanoma Maternal Uncle     Colon cancer Neg Hx     Ovarian cancer Neg Hx     Uterine cancer Neg Hx     Prostate cancer Neg Hx       reports that she quit smoking about 13 years ago. Her smoking use included cigarettes. She started smoking about 16 years ago. She has a 1.5 pack-year smoking history. She has never used smokeless tobacco. She reports that she does not currently use alcohol. She reports that she does not use drugs.      Current Outpatient Medications:     ALPRAZolam (Xanax) 0.5 MG tablet, 1 tablet Orally q day prn,  "Disp: , Rfl:     buPROPion XL (WELLBUTRIN XL) 300 MG 24 hr tablet, Daily., Disp: , Rfl:     Cholecalciferol (VITAMIN D3 PO), Take 25 mcg by mouth Daily., Disp: , Rfl:     clobetasol (TEMOVATE) 0.05 % ointment, APPLY 1 APPLICATION TWICE DAILY FOR 30 DAYS, Disp: , Rfl:     Desvenlafaxine Succinate ER 25 MG tablet sustained-release 24 hour, , Disp: , Rfl:     esomeprazole (nexIUM) 40 MG capsule, Take 1 capsule by mouth Every Morning Before Breakfast., Disp: , Rfl:     estradiol (ESTRACE) 1 MG tablet, Take 1.5 tablets by mouth Daily., Disp: 45 tablet, Rfl: 11    eszopiclone (LUNESTA) 3 MG tablet, TAKE 1 TABLET BY MOUTH AT BEDTIME (IMMEDIATELY BEFORE BEDTIME), Disp: , Rfl:     losartan-hydrochlorothiazide (HYZAAR) 100-25 MG per tablet, Take 1 tablet by mouth Daily., Disp: , Rfl:     medroxyPROGESTERone (PROVERA) 2.5 MG tablet, Take 1 tablet by mouth Daily., Disp: 30 tablet, Rfl: 11    Mounjaro 7.5 MG/0.5ML solution auto-injector, , Disp: , Rfl:     rosuvastatin (CRESTOR) 20 MG tablet, Take 1 tablet by mouth Daily., Disp: , Rfl:     OBJECTIVE:  /68   Ht 160 cm (63\")   Wt 52.2 kg (115 lb)   LMP 07/19/2020 (Exact Date) Comment: last menstrual cycle was \"a little over a year ago\"  BMI 20.37 kg/m²    Physical Exam  Exam conducted with a chaperone present.   Constitutional:       Appearance: She is well-developed.   HENT:      Head: Normocephalic and atraumatic.   Eyes:      General: Lids are normal.      Conjunctiva/sclera: Conjunctivae normal.      Pupils: Pupils are equal, round, and reactive to light.   Neck:      Thyroid: No thyromegaly.   Cardiovascular:      Rate and Rhythm: Normal rate and regular rhythm.      Heart sounds: Normal heart sounds.   Pulmonary:      Effort: Pulmonary effort is normal.      Breath sounds: Normal breath sounds.   Chest:   Breasts:     Breasts are symmetrical.      Right: No inverted nipple, mass, nipple discharge, skin change or tenderness.      Left: No inverted nipple, mass, " nipple discharge, skin change or tenderness.   Abdominal:      General: Bowel sounds are normal.      Palpations: Abdomen is soft.   Genitourinary:     Exam position: Supine.      Labia:         Right: No rash, tenderness, lesion or injury.         Left: No rash, tenderness, lesion or injury.       Vagina: No signs of injury and foreign body. No vaginal discharge, erythema, tenderness or bleeding.      Cervix: No cervical motion tenderness, discharge or friability.      Uterus: Not deviated, not enlarged, not fixed and not tender.       Adnexa:         Right: No mass, tenderness or fullness.          Left: No mass, tenderness or fullness.        Rectum: Normal. No tenderness or external hemorrhoid.   Musculoskeletal:         General: Normal range of motion.      Cervical back: Normal range of motion and neck supple.   Skin:     General: Skin is warm and dry.   Neurological:      Mental Status: She is alert and oriented to person, place, and time.         Assessment/Plan    Diagnoses and all orders for this visit:    1. Well woman exam with routine gynecological exam (Primary)  -     Liquid-based Pap Smear, Screening    2. Menopausal symptoms  Comments:  Patient is doing well with 1-1/2 mg of estrogen and progesterone.  She is given refills of same.  She is instructed to call with any bleeding or spotting.  Orders:  -     estradiol (ESTRACE) 1 MG tablet; Take 1.5 tablets by mouth Daily.  Dispense: 45 tablet; Refill: 11  -     medroxyPROGESTERone (PROVERA) 2.5 MG tablet; Take 1 tablet by mouth Daily.  Dispense: 30 tablet; Refill: 11    3. Encounter for screening mammogram for malignant neoplasm of breast  -     Mammo screening digital tomosynthesis bilateral w CAD; Future    Preventative and Immunizations:      - Tetanus: Unknown or >10 years ago. Recommend to have at pharmacy or on injury.      - Influenza: recommended annually      - Pneumovax:once after age 65      - Prevnar: Once after age 65      - Zostavax: Once  after age 60  Colon Cancer Screening: Due 2026  Mammogram: order placed  PAP: discussed guidelines. Patient prefers screening.   DEXA:  BMD testing (DXA) in all women 65 years of age and older and in postmenopausal women younger than 65 years of age with clinical risk factors for fracture   COVID vaccine information is available at vaccine.[place].gov   Additional preventative recommendations in AVS.     She will schedule a mammogram.  She will followup in one year or sooner if needed.      An After Visit Summary was printed and given to the patient at discharge.  Return in about 1 year (around 2/14/2026) for Annual physical.          Holly Wagner APRN 2/14/2025   Electronically signed

## 2025-02-17 LAB
GEN CATEG CVX/VAG CYTO-IMP: NORMAL
HPV I/H RISK 4 DNA CVX QL PROBE+SIG AMP: NOT DETECTED
LAB AP CASE REPORT: NORMAL
LAB AP GYN ADDITIONAL INFORMATION: NORMAL
LAB AP GYN OTHER FINDINGS: NORMAL
Lab: NORMAL
PATH INTERP SPEC-IMP: NORMAL
STAT OF ADQ CVX/VAG CYTO-IMP: NORMAL

## (undated) DEVICE — CONMED SCOPE SAVER BITE BLOCK, 20X27 MM: Brand: SCOPE SAVER

## (undated) DEVICE — PK KN ARTHSCP LF 60

## (undated) DEVICE — DISPOSABLE TOURNIQUET CUFF SINGLE BLADDER, DUAL PORT AND QUICK CONNECT CONNECTOR: Brand: COLOR CUFF

## (undated) DEVICE — GLV SURG BIOGEL M LTX PF 7 1/2

## (undated) DEVICE — 2, DISPOSABLE SUCTION/IRRIGATOR WITHOUT DISPOSABLE TIP: Brand: STRYKEFLOW

## (undated) DEVICE — ENDOPATH XCEL DILATING TIP TROCARS WITH STABILITY SLEEVES: Brand: ENDOPATH XCEL

## (undated) DEVICE — CUFF,BP,DISP,1 TUBE,ADULT,HP: Brand: MEDLINE

## (undated) DEVICE — MASK,OXYGEN,MED CONC,ADLT,7' TUB, UC: Brand: PENDING

## (undated) DEVICE — SPNG GZ WOVN 4X4IN 12PLY 10/BX STRL

## (undated) DEVICE — THE SINGLE USE ETRAP – POLYP TRAP IS USED FOR SUCTION RETRIEVAL OF ENDOSCOPICALLY REMOVED POLYPS.: Brand: ETRAP

## (undated) DEVICE — PAD LAP CHOLE: Brand: MEDLINE INDUSTRIES, INC.

## (undated) DEVICE — THE ECHELON FLEX POWERED PLUS ARTICULATING ENDOSCOPIC LINEAR CUTTERS ARE STERILE, SINGLE PATIENT USE INSTRUMENTS THAT SIMULTANEOUSLYCUT AND STAPLE TISSUE. THERE ARE SIX STAGGERED ROWS OF STAPLES, THREE ON EITHER SIDE OF THE CUT LINE. THE ECHELON FLEX 45 POWERED PLUSINSTRUMENTS HAVE A STAPLE LINE THAT IS APPROXIMATELY 45 MM LONG AND A CUT LINE THAT IS APPROXIMATELY 42 MM LONG. THE SHAFT CAN ROTATE FREELYIN BOTH DIRECTIONS AND AN ARTICULATION MECHANISM ENABLES THE DISTAL PORTION OF THE SHAFT TO PIVOT TO FACILITATE LATERAL ACCESS TO THE OPERATIVESITE.THE INSTRUMENTS ARE PACKAGED WITH A PRIMARY LITHIUM BATTERY PACK THAT MUST BE INSTALLED PRIOR TO USE. THERE ARE SPECIFIC REQUIREMENTS FORDISPOSING OF THE BATTERY PACK. REFER TO THE BATTERY PACK DISPOSAL SECTION.THE INSTRUMENTS ARE PACKAGED WITHOUT A RELOAD AND MUST BE LOADED PRIOR TO USE. A STAPLE RETAINING CAP ON THE RELOAD PROTECTS THE STAPLE LEGPOINTS DURING SHIPPING AND TRANSPORTATION. THE INSTRUMENTS’ LOCK-OUT FEATURE IS DESIGNED TO PREVENT A USED OR IMPROPERLY INSTALLED RELOADFROM BEING REFIRED OR AN INSTRUMENT FROM BEING FIRED WITHOUT A RELOAD.: Brand: ECHELON FLEX

## (undated) DEVICE — SUT VIC 0 UR6 27IN VCP603H

## (undated) DEVICE — SUT ETHLN 3/0 FS1 663G

## (undated) DEVICE — DRSNG GZ CURAD XEROFORM NONADHS 5X9IN STRL

## (undated) DEVICE — TBG PUMP ARTHSCP MAIN AR6400 16FT

## (undated) DEVICE — SUT ETHLN 3-0 FS118IN 663H

## (undated) DEVICE — TBG PENCL TELESCP MEGADYNE SMOKE EVAC 10FT

## (undated) DEVICE — APPL CHLORAPREP HI/LITE 26ML ORNG

## (undated) DEVICE — 3M™ STERI-STRIP™ REINFORCED ADHESIVE SKIN CLOSURES, R1547, 1/2 IN X 4 IN (12 MM X 100 MM), 6 STRIPS/ENVELOPE: Brand: 3M™ STERI-STRIP™

## (undated) DEVICE — ENDOPATH XCEL WITH OPTIVIEW TECHNOLOGY DILATING TIP TROCARS WITH STABILITY SLEEVES: Brand: ENDOPATH XCEL OPTIVIEW

## (undated) DEVICE — THE CHANNEL CLEANING BRUSH IS A NYLON FLEXI BRUSH ATTACHED TO A FLEXIBLE PLASTIC SHEATH DESIGNED TO SAFELY REMOVE DEBRIS FROM FLEXIBLE ENDOSCOPES.

## (undated) DEVICE — SNAR POLYP CAPTIVATOR RND STFF 2.4 240CM 10MM 1P/U

## (undated) DEVICE — BLD SHAVER RESEC SABRE COOLCUT 5MM 13CM

## (undated) DEVICE — GLV SURG TRIUMPH LT PF LTX 8 STRL

## (undated) DEVICE — GLV SURG TRIUMPH PF LTX 7 STRL

## (undated) DEVICE — Device: Brand: DEFENDO AIR/WATER/SUCTION AND BIOPSY VALVE

## (undated) DEVICE — KT CLN CLEANOR SCPE

## (undated) DEVICE — TRAP FLD MINIVAC MEGADYNE 100ML

## (undated) DEVICE — POSTN SURG ARTHSCP KN HLDR

## (undated) DEVICE — SNAR POLYP SENSATION MICRO OVL 13 240X40

## (undated) DEVICE — PK TURNOVER RM ADV

## (undated) DEVICE — TOTAL TRAY, 16FR 10ML SIL FOLEY, URN: Brand: MEDLINE

## (undated) DEVICE — GOWN,PREVENTION PLUS,XLONG/XLARGE,STRL: Brand: MEDLINE

## (undated) DEVICE — SOL IRR LACT RNG BG 3000ML

## (undated) DEVICE — ENDOPATH PNEUMONEEDLE INSUFFLATION NEEDLES WITH LUER LOCK CONNECTORS 120MM: Brand: ENDOPATH

## (undated) DEVICE — ANTIBACTERIAL UNDYED BRAIDED (POLYGLACTIN 910), SYNTHETIC ABSORBABLE SUTURE: Brand: COATED VICRYL

## (undated) DEVICE — GLV SURG SIGNATURE ESSENTIAL PF LTX SZ8

## (undated) DEVICE — YANKAUER,BULB TIP WITH VENT: Brand: ARGYLE

## (undated) DEVICE — CLTH CLENS READYCLEANSE PERI CARE PK/5

## (undated) DEVICE — SENSR O2 OXIMAX FNGR A/ 18IN NONSTR

## (undated) DEVICE — PATIENT RETURN ELECTRODE, SINGLE-USE, CONTACT QUALITY MONITORING, ADULT, WITH 9FT CORD, FOR PATIENTS WEIGING OVER 33LBS. (15KG): Brand: MEGADYNE

## (undated) DEVICE — STERILE POLYISOPRENE POWDER-FREE SURGICAL GLOVES WITH EMOLLIENT COATING: Brand: PROTEXIS

## (undated) DEVICE — ENDOPATH XCEL WITH OPTIVIEW TECHNOLOGY UNIVERSAL TROCAR STABILITY SLEEVES: Brand: ENDOPATH XCEL OPTIVIEW

## (undated) DEVICE — STERILE POLYISOPRENE POWDER-FREE SURGICAL GLOVES: Brand: PROTEXIS

## (undated) DEVICE — MONOPOLAR METZENBAUM SCISSOR, MINI BLADE TIP, DISPOSABLE: Brand: MONOPOLAR METZENBAUM SCISSOR, MINI BLADE TIP, DISPOSABLE

## (undated) DEVICE — GLV SURG SIGNATURE ESSENTIAL PF LTX SZ7

## (undated) DEVICE — BNDG ELAS ELITE V/CLOSE 6IN 5YD LF STRL